# Patient Record
Sex: FEMALE | Race: WHITE | URBAN - METROPOLITAN AREA
[De-identification: names, ages, dates, MRNs, and addresses within clinical notes are randomized per-mention and may not be internally consistent; named-entity substitution may affect disease eponyms.]

---

## 2017-12-15 ENCOUNTER — OFFICE VISIT (OUTPATIENT)
Dept: ENDOCRINOLOGY | Age: 22
End: 2017-12-15

## 2017-12-15 VITALS
BODY MASS INDEX: 17.96 KG/M2 | OXYGEN SATURATION: 96 % | DIASTOLIC BLOOD PRESSURE: 78 MMHG | HEART RATE: 81 BPM | HEIGHT: 65 IN | WEIGHT: 107.8 LBS | SYSTOLIC BLOOD PRESSURE: 120 MMHG

## 2017-12-15 DIAGNOSIS — E03.8 HYPOTHYROIDISM DUE TO HASHIMOTO'S THYROIDITIS: Primary | ICD-10-CM

## 2017-12-15 DIAGNOSIS — E03.8 HYPOTHYROIDISM DUE TO HASHIMOTO'S THYROIDITIS: ICD-10-CM

## 2017-12-15 DIAGNOSIS — E06.3 HYPOTHYROIDISM DUE TO HASHIMOTO'S THYROIDITIS: Primary | ICD-10-CM

## 2017-12-15 DIAGNOSIS — E06.3 HYPOTHYROIDISM DUE TO HASHIMOTO'S THYROIDITIS: ICD-10-CM

## 2017-12-15 DIAGNOSIS — E06.3 HASHIMOTO'S THYROIDITIS: ICD-10-CM

## 2017-12-15 DIAGNOSIS — R79.0 LOW FERRITIN: ICD-10-CM

## 2017-12-15 LAB
A/G RATIO: 1.8 (ref 1.1–2.2)
ALBUMIN SERPL-MCNC: 4.7 G/DL (ref 3.4–5)
ALP BLD-CCNC: 57 U/L (ref 40–129)
ALT SERPL-CCNC: 9 U/L (ref 10–40)
ANION GAP SERPL CALCULATED.3IONS-SCNC: 14 MMOL/L (ref 3–16)
AST SERPL-CCNC: 17 U/L (ref 15–37)
BILIRUB SERPL-MCNC: 0.4 MG/DL (ref 0–1)
BUN BLDV-MCNC: 11 MG/DL (ref 7–20)
CALCIUM SERPL-MCNC: 10.3 MG/DL (ref 8.3–10.6)
CHLORIDE BLD-SCNC: 101 MMOL/L (ref 99–110)
CO2: 25 MMOL/L (ref 21–32)
CREAT SERPL-MCNC: 0.7 MG/DL (ref 0.6–1.1)
FERRITIN: 22.7 NG/ML (ref 15–150)
GFR AFRICAN AMERICAN: >60
GFR NON-AFRICAN AMERICAN: >60
GLOBULIN: 2.6 G/DL
GLUCOSE BLD-MCNC: 70 MG/DL (ref 70–99)
HCT VFR BLD CALC: 40.2 % (ref 36–48)
HEMOGLOBIN: 13.5 G/DL (ref 12–16)
IRON SATURATION: 21 % (ref 15–50)
IRON: 91 UG/DL (ref 37–145)
MCH RBC QN AUTO: 29.6 PG (ref 26–34)
MCHC RBC AUTO-ENTMCNC: 33.7 G/DL (ref 31–36)
MCV RBC AUTO: 88 FL (ref 80–100)
PDW BLD-RTO: 13 % (ref 12.4–15.4)
PLATELET # BLD: 322 K/UL (ref 135–450)
PMV BLD AUTO: 7.7 FL (ref 5–10.5)
POTASSIUM SERPL-SCNC: 4.9 MMOL/L (ref 3.5–5.1)
RBC # BLD: 4.57 M/UL (ref 4–5.2)
SODIUM BLD-SCNC: 140 MMOL/L (ref 136–145)
T3 TOTAL: 1.33 NG/ML (ref 0.64–1.95)
T4 FREE: 1.7 NG/DL (ref 0.9–1.8)
TOTAL IRON BINDING CAPACITY: 426 UG/DL (ref 260–445)
TOTAL PROTEIN: 7.3 G/DL (ref 6.4–8.2)
TSH SERPL DL<=0.05 MIU/L-ACNC: 1.46 UIU/ML (ref 0.27–4.2)
WBC # BLD: 6.6 K/UL (ref 4–11)

## 2017-12-15 PROCEDURE — 99203 OFFICE O/P NEW LOW 30 MIN: CPT | Performed by: INTERNAL MEDICINE

## 2017-12-15 RX ORDER — LEVOTHYROXINE SODIUM 112 UG/1
TABLET ORAL
COMMUNITY
Start: 2017-10-23 | End: 2017-12-15 | Stop reason: SDUPTHER

## 2017-12-15 RX ORDER — LEVOTHYROXINE SODIUM 112 UG/1
112 TABLET ORAL DAILY
Qty: 90 TABLET | Refills: 1 | Status: SHIPPED | OUTPATIENT
Start: 2017-12-15 | End: 2018-01-24 | Stop reason: SDUPTHER

## 2017-12-15 NOTE — PROGRESS NOTES
SUBJECTIVE:  Clementina Negron is a 23 y.o. female who is here for hypothyroidism. 1. Hypothyroidism due to Hashimoto's thyroiditis    This started in 2015. Patient was diagnosed with hypothyroidism. The problem has been unchanged. Previous thyroid studies include: TSH and free thyroxine. Patient started medication in 2015. Currently patient is on: levothyroxine. Misses  0 doses a month. Current complaints: fatigue, feeling cold and cold intolerance, dry skin. History of obstructive symptoms: difficulty swallowing No, changes in voice/hoarseness No.    History of radiation to patient's neck: No  Resent iodine exposure: No  Family history includes goiter. Family history of thyroid cancer: No    [place] did not work, TSH was high. 2. Hashimoto's thyroiditis  Has fatigue. 3. Low ferritin  Has fatigue. Worse in morning, afternoon. Moderate. Past Medical History:   Diagnosis Date    GERD (gastroesophageal reflux disease)     Hypothyroidism     Irritable bowel syndrome      There are no active problems to display for this patient. Past Surgical History:   Procedure Laterality Date    COLONOSCOPY      UPPER GASTROINTESTINAL ENDOSCOPY       Family History   Problem Relation Age of Onset    No Known Problems Mother     High Cholesterol Father     Thyroid Disease Father      Social History     Social History    Marital status: Single     Spouse name: N/A    Number of children: N/A    Years of education: N/A     Social History Main Topics    Smoking status: Never Smoker    Smokeless tobacco: Never Used    Alcohol use No    Drug use: No    Sexual activity: No     Other Topics Concern    None     Social History Narrative    None     Current Outpatient Prescriptions   Medication Sig Dispense Refill    levothyroxine (SYNTHROID) 112 MCG tablet       Desogestrel-Ethinyl Estradiol (VIORELE PO) Take by mouth       No current facility-administered medications for this visit.       No Known Allergies  Family Status   Relation Status    Mother [de-identified]    Father Alive       Review of Systems:  Constitutional: has fatigue, no fever, no recent weight gain, no recent weight loss, no changes in appetite  Eyes: no eye pain, no change in vision, no eye redness, no eye irritation, no double vision, sometimes blurry vision  Ears, nose, throat: no nasal congestion, no sore throat, no earache, no decrease in hearing, no hoarseness, no dry mouth, has sinus problems, no difficulty swallowing, no neck lumps, no dental problems, no mouth sores, no ringing in ears  Pulmonary: no shortness of breath, no wheezing, no dyspnea on exertion, no cough  Cardiovascular: no chest pain, no lower extremity edema, no orthopnea, no palpitations  Gastrointestinal: no abdominal pain, no nausea, no vomiting, no diarrhea, no constipation, no heartburn, no bloating  Genitourinary: no dysuria, no urinary incontinence, no urinary hesitancy, no change in urinary frequency, no feelings of urinary urgency, no nocturia  Musculoskeletal: no joint swelling, no joint stiffness, no joint pain, no muscle cramps, no muscle pain, no bone pain, had finger fracture  Integument/Breast: no hair loss, no skin rashes, no skin lesions, no itching, has dry skin, no breast pain, no breast mass, no skin hives, no skin discoloration, no nipple discharge  Neurological: no numbness, no tingling, no weakness, no confusion, has headaches, no dizziness, no fainting, no tremors, no decrease in memory, no balance problems  Psychiatric: no anxiety, no depression, no insomnia  Hematologic/Lymphatic: no tendency for easy bleeding, no swollen lymph nodes, no tendency for easy bruising  Immunology: no seasonal allergies, no frequent infections, no frequent illnesses  Endocrine: has cold temperature intolerance, no hot flashes, no hand tremor    OBJECTIVE:   /78 (Site: Left Arm, Position: Sitting, Cuff Size: Medium Adult)   Pulse 81   Ht 5' 5\" (1.651 m)   Wt 107 lb FT3.    3. Low ferritin    - Iron and TIBC; Future  - CBC; Future  - FERRITIN; Future  - FERRITIN; Future  - Iron and TIBC; Future  - CBC; Future      Reviewed and/or ordered clinical lab results Yes  Reviewed and/or ordered radiology tests Yes   Reviewed and/or ordered other diagnostic tests No  Discussed test results with performing physician No  Independently reviewed image, tracing, or specimen No  Made a decision to obtain old records Yes  Reviewed old records Yes   Obtained history from other than patient Yes    Medhatlaila Mayes was counseled regarding symptoms of current diagnosis, course and complications of disease if inadequately treated, side effects of medications, diagnosis, treatment options, and prognosis, risks, benefits, complications, and alternatives of treatment, labs, imaging and other studies and treatment targets and goals. She understands instructions and counseling. Return in about 6 months (around 6/15/2018) for thyroid problems.

## 2018-01-24 DIAGNOSIS — E03.8 HYPOTHYROIDISM DUE TO HASHIMOTO'S THYROIDITIS: ICD-10-CM

## 2018-01-24 DIAGNOSIS — E06.3 HYPOTHYROIDISM DUE TO HASHIMOTO'S THYROIDITIS: ICD-10-CM

## 2018-01-24 RX ORDER — LEVOTHYROXINE SODIUM 112 UG/1
112 TABLET ORAL DAILY
Qty: 90 TABLET | Refills: 1 | Status: SHIPPED | OUTPATIENT
Start: 2018-01-24 | End: 2018-01-25 | Stop reason: SDUPTHER

## 2018-01-25 RX ORDER — LEVOTHYROXINE SODIUM 112 UG/1
112 TABLET ORAL DAILY
Qty: 90 TABLET | Refills: 1 | Status: SHIPPED | OUTPATIENT
Start: 2018-01-25 | End: 2018-04-23 | Stop reason: SDUPTHER

## 2018-04-23 DIAGNOSIS — E06.3 HYPOTHYROIDISM DUE TO HASHIMOTO'S THYROIDITIS: ICD-10-CM

## 2018-04-23 DIAGNOSIS — E03.8 HYPOTHYROIDISM DUE TO HASHIMOTO'S THYROIDITIS: ICD-10-CM

## 2018-04-23 RX ORDER — LEVOTHYROXINE SODIUM 112 UG/1
112 TABLET ORAL DAILY
Qty: 90 TABLET | Refills: 1 | Status: SHIPPED | OUTPATIENT
Start: 2018-04-23 | End: 2018-07-19 | Stop reason: SDUPTHER

## 2018-06-15 ENCOUNTER — OFFICE VISIT (OUTPATIENT)
Dept: ENDOCRINOLOGY | Age: 23
End: 2018-06-15

## 2018-06-15 VITALS
WEIGHT: 104.4 LBS | DIASTOLIC BLOOD PRESSURE: 68 MMHG | SYSTOLIC BLOOD PRESSURE: 104 MMHG | OXYGEN SATURATION: 99 % | BODY MASS INDEX: 17.4 KG/M2 | HEART RATE: 92 BPM | HEIGHT: 65 IN

## 2018-06-15 DIAGNOSIS — R79.0 LOW FERRITIN: ICD-10-CM

## 2018-06-15 DIAGNOSIS — E03.9 ACQUIRED HYPOTHYROIDISM: Primary | ICD-10-CM

## 2018-06-15 DIAGNOSIS — E06.3 HASHIMOTO'S THYROIDITIS: ICD-10-CM

## 2018-06-15 PROCEDURE — 99214 OFFICE O/P EST MOD 30 MIN: CPT | Performed by: INTERNAL MEDICINE

## 2018-06-15 PROCEDURE — G8419 CALC BMI OUT NRM PARAM NOF/U: HCPCS | Performed by: INTERNAL MEDICINE

## 2018-06-15 PROCEDURE — G8427 DOCREV CUR MEDS BY ELIG CLIN: HCPCS | Performed by: INTERNAL MEDICINE

## 2018-06-15 PROCEDURE — 1036F TOBACCO NON-USER: CPT | Performed by: INTERNAL MEDICINE

## 2018-06-15 RX ORDER — LEVONORGESTREL AND ETHINYL ESTRADIOL 100-20(84)
KIT ORAL
Refills: 1 | COMMUNITY
Start: 2018-05-29

## 2018-06-15 ASSESSMENT — PATIENT HEALTH QUESTIONNAIRE - PHQ9
2. FEELING DOWN, DEPRESSED OR HOPELESS: 0
1. LITTLE INTEREST OR PLEASURE IN DOING THINGS: 0
SUM OF ALL RESPONSES TO PHQ QUESTIONS 1-9: 0
SUM OF ALL RESPONSES TO PHQ9 QUESTIONS 1 & 2: 0

## 2018-07-19 DIAGNOSIS — E03.8 HYPOTHYROIDISM DUE TO HASHIMOTO'S THYROIDITIS: ICD-10-CM

## 2018-07-19 DIAGNOSIS — E06.3 HYPOTHYROIDISM DUE TO HASHIMOTO'S THYROIDITIS: ICD-10-CM

## 2018-07-19 RX ORDER — LEVOTHYROXINE SODIUM 112 UG/1
112 TABLET ORAL DAILY
Qty: 90 TABLET | Refills: 1 | Status: SHIPPED | OUTPATIENT
Start: 2018-07-19 | End: 2018-09-17 | Stop reason: SDUPTHER

## 2018-09-17 ENCOUNTER — OFFICE VISIT (OUTPATIENT)
Dept: ENDOCRINOLOGY | Age: 23
End: 2018-09-17

## 2018-09-17 VITALS
DIASTOLIC BLOOD PRESSURE: 82 MMHG | WEIGHT: 111 LBS | SYSTOLIC BLOOD PRESSURE: 124 MMHG | BODY MASS INDEX: 18.49 KG/M2 | HEIGHT: 65 IN | HEART RATE: 98 BPM | OXYGEN SATURATION: 99 %

## 2018-09-17 DIAGNOSIS — R79.0 LOW FERRITIN: ICD-10-CM

## 2018-09-17 DIAGNOSIS — E03.9 ACQUIRED HYPOTHYROIDISM: Primary | ICD-10-CM

## 2018-09-17 DIAGNOSIS — E06.3 HASHIMOTO'S THYROIDITIS: ICD-10-CM

## 2018-09-17 PROCEDURE — 1036F TOBACCO NON-USER: CPT | Performed by: INTERNAL MEDICINE

## 2018-09-17 PROCEDURE — G8427 DOCREV CUR MEDS BY ELIG CLIN: HCPCS | Performed by: INTERNAL MEDICINE

## 2018-09-17 PROCEDURE — 99214 OFFICE O/P EST MOD 30 MIN: CPT | Performed by: INTERNAL MEDICINE

## 2018-09-17 PROCEDURE — G8419 CALC BMI OUT NRM PARAM NOF/U: HCPCS | Performed by: INTERNAL MEDICINE

## 2018-09-17 RX ORDER — LEVOTHYROXINE SODIUM 112 UG/1
TABLET ORAL
Qty: 120 TABLET | Refills: 1
Start: 2018-09-17 | End: 2018-10-18 | Stop reason: SDUPTHER

## 2018-09-17 ASSESSMENT — PATIENT HEALTH QUESTIONNAIRE - PHQ9
SUM OF ALL RESPONSES TO PHQ QUESTIONS 1-9: 0
1. LITTLE INTEREST OR PLEASURE IN DOING THINGS: 0
SUM OF ALL RESPONSES TO PHQ9 QUESTIONS 1 & 2: 0
2. FEELING DOWN, DEPRESSED OR HOPELESS: 0
SUM OF ALL RESPONSES TO PHQ QUESTIONS 1-9: 0

## 2018-09-17 NOTE — PROGRESS NOTES
SUBJECTIVE:  Ivon Posey is a 26 y.o. female who is here for hypothyroidism. 1. Hypothyroidism     This started in 2015. Patient was diagnosed with hypothyroidism. The problem has been unchanged. Patient started medication in 2015. Currently patient is on: levothyroxine. Misses  0 doses a month. Current complaints: fatigue, feeling cold and cold intolerance, dry skin. Same. History of obstructive symptoms: difficulty swallowing No, changes in voice/hoarseness No.  History of radiation to patient's neck: No  Resent iodine exposure: No  Family history includes goiter. Family history of thyroid cancer: No    [place] did not work, TSH was high. 2. Hashimoto's thyroiditis  Has fatigue. 3. Low ferritin  Has fatigue. Worse in morning, afternoon. Moderate. Can not take supplement.     Past Medical History:   Diagnosis Date    GERD (gastroesophageal reflux disease)     Hypothyroidism     Irritable bowel syndrome      Patient Active Problem List    Diagnosis Date Noted    Acquired hypothyroidism 12/15/2017    Hashimoto's thyroiditis 12/15/2017    Low ferritin 12/15/2017     Past Surgical History:   Procedure Laterality Date    COLONOSCOPY      UPPER GASTROINTESTINAL ENDOSCOPY       Family History   Problem Relation Age of Onset    No Known Problems Mother     High Cholesterol Father     Thyroid Disease Father      Social History     Social History    Marital status: Single     Spouse name: N/A    Number of children: N/A    Years of education: N/A     Social History Main Topics    Smoking status: Never Smoker    Smokeless tobacco: Never Used    Alcohol use No    Drug use: No    Sexual activity: No     Other Topics Concern    None     Social History Narrative    None     Current Outpatient Prescriptions   Medication Sig Dispense Refill    levothyroxine (SYNTHROID) 112 MCG tablet 6 days a week, one and a half tablet 1 day a week 120 tablet 1    Levonorgest-Eth Estrad 91-Day 0.1-0.02 & 0.01 MG TABS TAKE 1 TABLET BY MOUTH DAILY FOR 91 DAYS  1     No current facility-administered medications for this visit.       No Known Allergies  Family Status   Relation Status    Mother [de-identified]    Father Alive       Review of Systems:  Constitutional: has fatigue, no fever, no recent weight gain, no recent weight loss, no changes in appetite  Eyes: no eye pain, no change in vision, no eye redness, no eye irritation, no double vision, sometimes blurry vision  Ears, nose, throat: no nasal congestion, no sore throat, no earache, no decrease in hearing, no hoarseness, no dry mouth, has sinus problems, no difficulty swallowing, no neck lumps, no dental problems, no mouth sores, no ringing in ears  Pulmonary: no shortness of breath, no wheezing, no dyspnea on exertion, no cough  Cardiovascular: no chest pain, no lower extremity edema, no orthopnea, no palpitations  Gastrointestinal: no abdominal pain, no nausea, no vomiting, no diarrhea, no constipation, no heartburn, no bloating  Genitourinary: no dysuria, no urinary incontinence, no urinary hesitancy, no change in urinary frequency, no feelings of urinary urgency, no nocturia  Musculoskeletal: no joint swelling, no joint stiffness, no joint pain, no muscle cramps, no muscle pain, no bone pain, had finger fracture  Integument/Breast: no hair loss, no skin rashes, no skin lesions, no itching, has dry skin, no breast pain, no breast mass, no skin hives, no skin discoloration, no nipple discharge  Neurological: no numbness, no tingling, no weakness, no confusion, has headaches, no dizziness, no fainting, no tremors, no decrease in memory, no balance problems  Psychiatric: no anxiety, no depression, no insomnia  Hematologic/Lymphatic: no tendency for easy bleeding, no swollen lymph nodes, no tendency for easy bruising  Immunology: no seasonal allergies, no frequent infections, no frequent illnesses  Endocrine: has cold temperature intolerance, no hot flashes, no hand Reflex; Future  - T4, Free; Future  - T3; Future  Uncontrolled. 2. Hashimoto's thyroiditis  TSH, FT4, FT3.    3. Low ferritin  Still low normal ferritin. Can't take iron supplements. Follow for now. - Iron and TIBC; Future  - CBC; Future  - FERRITIN; Future    Reviewed and/or ordered clinical lab results Yes  Reviewed and/or ordered radiology tests Yes   Reviewed and/or ordered other diagnostic tests No  Discussed test results with performing physician No  Independently reviewed image, tracing, or specimen No  Made a decision to obtain old records Yes  Reviewed old records Yes   Obtained history from other than patient Yes    Garima Woodruff was counseled regarding symptoms of thyroid diagnosis, course and complications of disease if inadequately treated, side effects of medications, diagnosis, treatment options, and prognosis, risks, benefits, complications, and alternatives of treatment, labs, imaging and other studies and treatment targets and goals. She understands instructions and counseling. Return in about 3 months (around 12/17/2018) for thyroid problems.

## 2018-10-18 DIAGNOSIS — E03.9 ACQUIRED HYPOTHYROIDISM: ICD-10-CM

## 2018-10-18 RX ORDER — LEVOTHYROXINE SODIUM 112 UG/1
TABLET ORAL
Qty: 120 TABLET | Refills: 0 | Status: SHIPPED | OUTPATIENT
Start: 2018-10-18 | End: 2019-02-08 | Stop reason: SDUPTHER

## 2018-10-18 NOTE — TELEPHONE ENCOUNTER
Patient called for a refill of Levothyroxine, sent to a DIFFERENT [facility] for [facility].  The address is [address] [phone]

## 2018-12-04 ENCOUNTER — TELEPHONE (OUTPATIENT)
Dept: ENDOCRINOLOGY | Age: 23
End: 2018-12-04

## 2018-12-11 ENCOUNTER — OFFICE VISIT (OUTPATIENT)
Dept: ENDOCRINOLOGY | Age: 23
End: 2018-12-11
Payer: COMMERCIAL

## 2018-12-11 VITALS
WEIGHT: 105.8 LBS | HEIGHT: 65 IN | OXYGEN SATURATION: 99 % | BODY MASS INDEX: 17.63 KG/M2 | DIASTOLIC BLOOD PRESSURE: 74 MMHG | SYSTOLIC BLOOD PRESSURE: 118 MMHG | HEART RATE: 91 BPM

## 2018-12-11 DIAGNOSIS — E03.9 ACQUIRED HYPOTHYROIDISM: Primary | ICD-10-CM

## 2018-12-11 DIAGNOSIS — R79.0 LOW FERRITIN: ICD-10-CM

## 2018-12-11 DIAGNOSIS — E06.3 HASHIMOTO'S THYROIDITIS: ICD-10-CM

## 2018-12-11 PROCEDURE — G8484 FLU IMMUNIZE NO ADMIN: HCPCS | Performed by: INTERNAL MEDICINE

## 2018-12-11 PROCEDURE — G8419 CALC BMI OUT NRM PARAM NOF/U: HCPCS | Performed by: INTERNAL MEDICINE

## 2018-12-11 PROCEDURE — 99214 OFFICE O/P EST MOD 30 MIN: CPT | Performed by: INTERNAL MEDICINE

## 2018-12-11 PROCEDURE — G8427 DOCREV CUR MEDS BY ELIG CLIN: HCPCS | Performed by: INTERNAL MEDICINE

## 2018-12-11 PROCEDURE — 1036F TOBACCO NON-USER: CPT | Performed by: INTERNAL MEDICINE

## 2018-12-11 NOTE — PROGRESS NOTES
Levonorgest-Eth Estrad 91-Day 0.1-0.02 & 0.01 MG TABS TAKE 1 TABLET BY MOUTH DAILY FOR 91 DAYS  1     No current facility-administered medications for this visit.       No Known Allergies  Family Status   Relation Status    Mother [de-identified]    Father Alive       Review of Systems:  Constitutional: has fatigue, no fever, no recent weight gain, no recent weight loss, no changes in appetite  Eyes: no eye pain, no change in vision, no eye redness, no eye irritation, no double vision, sometimes blurry vision  Ears, nose, throat: no nasal congestion, no sore throat, no earache, no decrease in hearing, no hoarseness, no dry mouth, has sinus problems, no difficulty swallowing, no neck lumps, no dental problems, no mouth sores, no ringing in ears  Pulmonary: no shortness of breath, no wheezing, no dyspnea on exertion, no cough  Cardiovascular: no chest pain, no lower extremity edema, no orthopnea, no palpitations  Gastrointestinal: no abdominal pain, no nausea, no vomiting, no diarrhea, no constipation, no heartburn, no bloating  Genitourinary: no dysuria, no urinary incontinence, no urinary hesitancy, no change in urinary frequency, no feelings of urinary urgency, no nocturia  Musculoskeletal: no joint swelling, no joint stiffness, no joint pain, no muscle cramps, no muscle pain, no bone pain, had finger fracture  Integument/Breast: no hair loss, no skin rashes, no skin lesions, no itching, has dry skin, no breast pain, no breast mass, no skin hives, no skin discoloration, no nipple discharge  Neurological: no numbness, no tingling, no weakness, no confusion, has headaches, no dizziness, no fainting, no tremors, no decrease in memory, no balance problems  Psychiatric: no anxiety, no depression, no insomnia  Hematologic/Lymphatic: no tendency for easy bleeding, no swollen lymph nodes, no tendency for easy bruising  Immunology: no seasonal allergies, no frequent infections, no frequent illnesses  Endocrine: has cold temperature

## 2018-12-19 ENCOUNTER — TELEPHONE (OUTPATIENT)
Dept: ENDOCRINOLOGY | Age: 23
End: 2018-12-19

## 2019-02-08 DIAGNOSIS — E03.9 ACQUIRED HYPOTHYROIDISM: ICD-10-CM

## 2019-02-08 RX ORDER — LEVOTHYROXINE SODIUM 112 UG/1
TABLET ORAL
Qty: 120 TABLET | Refills: 1 | Status: SHIPPED | OUTPATIENT
Start: 2019-02-08 | End: 2019-03-29

## 2019-03-29 ENCOUNTER — OFFICE VISIT (OUTPATIENT)
Dept: ENDOCRINOLOGY | Age: 24
End: 2019-03-29
Payer: COMMERCIAL

## 2019-03-29 VITALS
SYSTOLIC BLOOD PRESSURE: 116 MMHG | HEART RATE: 87 BPM | WEIGHT: 108.4 LBS | DIASTOLIC BLOOD PRESSURE: 67 MMHG | HEIGHT: 65 IN | BODY MASS INDEX: 18.06 KG/M2

## 2019-03-29 DIAGNOSIS — E03.9 ACQUIRED HYPOTHYROIDISM: Primary | ICD-10-CM

## 2019-03-29 DIAGNOSIS — R79.0 LOW FERRITIN: ICD-10-CM

## 2019-03-29 DIAGNOSIS — E06.3 HASHIMOTO'S THYROIDITIS: ICD-10-CM

## 2019-03-29 PROCEDURE — 99214 OFFICE O/P EST MOD 30 MIN: CPT | Performed by: INTERNAL MEDICINE

## 2019-03-29 RX ORDER — LEVOTHYROXINE SODIUM 112 UG/1
112 TABLET ORAL DAILY
Qty: 90 TABLET | Refills: 1 | Status: SHIPPED | OUTPATIENT
Start: 2019-03-29 | End: 2019-11-27

## 2019-06-28 ENCOUNTER — OFFICE VISIT (OUTPATIENT)
Dept: ENDOCRINOLOGY | Age: 24
End: 2019-06-28
Payer: COMMERCIAL

## 2019-06-28 VITALS
HEART RATE: 120 BPM | BODY MASS INDEX: 18.29 KG/M2 | DIASTOLIC BLOOD PRESSURE: 80 MMHG | SYSTOLIC BLOOD PRESSURE: 131 MMHG | WEIGHT: 109.8 LBS | HEIGHT: 65 IN | OXYGEN SATURATION: 98 %

## 2019-06-28 DIAGNOSIS — E83.52 HYPERCALCEMIA: ICD-10-CM

## 2019-06-28 DIAGNOSIS — E06.3 HASHIMOTO'S THYROIDITIS: ICD-10-CM

## 2019-06-28 DIAGNOSIS — E03.9 ACQUIRED HYPOTHYROIDISM: Primary | ICD-10-CM

## 2019-06-28 DIAGNOSIS — R79.0 LOW FERRITIN: ICD-10-CM

## 2019-06-28 PROCEDURE — 99214 OFFICE O/P EST MOD 30 MIN: CPT | Performed by: INTERNAL MEDICINE

## 2019-06-28 NOTE — PROGRESS NOTES
Non-medical: None   Tobacco Use    Smoking status: Never Smoker    Smokeless tobacco: Never Used   Substance and Sexual Activity    Alcohol use: No    Drug use: No    Sexual activity: Never   Lifestyle    Physical activity:     Days per week: None     Minutes per session: None    Stress: None   Relationships    Social connections:     Talks on phone: None     Gets together: None     Attends Oriental orthodox service: None     Active member of club or organization: None     Attends meetings of clubs or organizations: None     Relationship status: None    Intimate partner violence:     Fear of current or ex partner: None     Emotionally abused: None     Physically abused: None     Forced sexual activity: None   Other Topics Concern    None   Social History Narrative    None     Current Outpatient Medications   Medication Sig Dispense Refill    levothyroxine (SYNTHROID) 112 MCG tablet Take 1 tablet by mouth Daily 90 tablet 1    Levonorgest-Eth Estrad 91-Day 0.1-0.02 & 0.01 MG TABS TAKE 1 TABLET BY MOUTH DAILY FOR 91 DAYS  1     No current facility-administered medications for this visit.       No Known Allergies  Family Status   Relation Name Status    Mother  Alive    Father  Alive       Review of Systems:  Constitutional: has fatigue, no fever, no recent weight gain, no recent weight loss, no changes in appetite  Eyes: no eye pain, no change in vision, no eye redness, no eye irritation, no double vision, sometimes blurry vision  Ears, nose, throat: no nasal congestion, no sore throat, no earache, no decrease in hearing, no hoarseness, no dry mouth, has sinus problems, no difficulty swallowing, no neck lumps, no dental problems, no mouth sores, no ringing in ears  Pulmonary: no shortness of breath, no wheezing, no dyspnea on exertion, no cough  Cardiovascular: no chest pain, no lower extremity edema, no orthopnea, no palpitations  Gastrointestinal: no abdominal pain, no nausea, no vomiting, no diarrhea, no constipation, no heartburn, no bloating  Genitourinary: no dysuria, no urinary incontinence, no urinary hesitancy, no change in urinary frequency, no feelings of urinary urgency, no nocturia  Musculoskeletal: no joint swelling, no joint stiffness, no joint pain, no muscle cramps, no muscle pain, no bone pain, had finger fracture  Integument/Breast: no hair loss, no skin rashes, no skin lesions  Neurological: no numbness, no tingling, no weakness, no confusion, has headaches, no dizziness, no fainting, no tremors, no decrease in memory, no balance problems  Psychiatric: no anxiety, no depression, no insomnia  Hematologic/Lymphatic: no tendency for easy bleeding, no swollen lymph nodes, no tendency for easy bruising  Immunology: no seasonal allergies, no frequent infections, no frequent illnesses  Endocrine: has cold temperature intolerance, no hot flashes, no hand tremor    OBJECTIVE:   /80 (Site: Left Upper Arm, Position: Sitting, Cuff Size: Medium Adult)   Pulse 120   Ht 5' 5\" (1.651 m)   Wt 109 lb 12.8 oz (49.8 kg)   SpO2 98%   BMI 18.27 kg/m²   Wt Readings from Last 3 Encounters:   06/28/19 109 lb 12.8 oz (49.8 kg)   03/29/19 108 lb 6.4 oz (49.2 kg)   12/11/18 105 lb 12.8 oz (48 kg)       Physical Exam:  Constitutional: no acute distress, well appearing, well nourished  Psychiatric: oriented to person, place and time, judgement, insight and normal, recent and remote memory and intact and mood, affect are normal  Skin: skin and subcutaneous tissue is normal without mass, normal turgor  Head and Face: examination of head and face revealed no abnormalities  Eyes: no lid or conjunctival swelling, no erythema or discharge, pupils are normal, equal, round, and reactive to light  Ears/Nose: external inspection of ears and nose revealed no abnormalities, hearing is grossly normal  Oropharynx/Mouth/Face: lips, tongue and gums are normal with no lesions, the voice quality was normal  Neck: neck is supple and symmetric, with midline trachea and no masses, thyroid is normal  Lymphatics: normal cervical lymph nodes, normal supraclavicular nodes  Pulmonary: no increased work of breathing or signs of respiratory distress, lungs are clear to auscultation  Cardiovascular: normal heart rate and rhythm, normal S1 and S2, no murmurs and pedal pulses and 2+ bilaterally, No edema  Abdomen: abdomen is soft, non-tender with no masses  Musculoskeletal: normal gait and station, exam of the digits and nails are normal  Neurological: normal coordination, normal general cortical function    Lab Review:  Lab Results   Component Value Date    TSH 1.46 12/15/2017     No results found for: FREET4     ASSESSMENT/PLAN:  1. Hypothyroidism   TSH 2.4-1.9  Levothyroxine 0.112 mg qd  - Comprehensive Metabolic Panel; Future  - TSH without Reflex; Future  - T4, Free; Future  - T3; Future    2. Hashimoto's thyroiditis  TSH, FT4, FT3.    3. Low ferritin  Still low normal ferritin. Can't take iron supplements. Follow for now. - Iron and TIBC; Future  - CBC; Future  - FERRITIN; Future    4. Hypercalcemia  New problem  2 glasses a day  1 Angie a day sometimes. Calcium 10. 6(10.5)  No MVI    Reviewed and/or ordered clinical lab results Yes  Reviewed and/or ordered radiology tests Yes   Reviewed and/or ordered other diagnostic tests No  Discussed test results with performing physician No  Independently reviewed image, tracing, or specimen No  Made a decision to obtain old records Yes  Reviewed old records Yes   Obtained history from other than patient Yes    Fish Damon was counseled regarding symptoms of thyroid diagnosis, course and complications of disease if inadequately treated, side effects of medications, diagnosis, treatment options, and prognosis, risks, benefits, complications, and alternatives of treatment, labs, imaging and other studies and treatment targets and goals, hypercalcemia, work up, causes.   She understands instructions and counseling. Total visit time 25 min, >50% was counseling time    Return in about 4 years (around 6/28/2023) for thyroid problems.

## 2019-11-26 PROBLEM — E83.52 HYPERCALCEMIA: Status: ACTIVE | Noted: 2019-11-26

## 2019-11-27 ENCOUNTER — OFFICE VISIT (OUTPATIENT)
Dept: ENDOCRINOLOGY | Age: 24
End: 2019-11-27
Payer: COMMERCIAL

## 2019-11-27 VITALS
HEIGHT: 65 IN | BODY MASS INDEX: 17.99 KG/M2 | OXYGEN SATURATION: 98 % | DIASTOLIC BLOOD PRESSURE: 76 MMHG | SYSTOLIC BLOOD PRESSURE: 120 MMHG | WEIGHT: 108 LBS | HEART RATE: 95 BPM

## 2019-11-27 DIAGNOSIS — E06.3 HASHIMOTO'S THYROIDITIS: ICD-10-CM

## 2019-11-27 DIAGNOSIS — R79.0 LOW FERRITIN: ICD-10-CM

## 2019-11-27 DIAGNOSIS — E03.9 ACQUIRED HYPOTHYROIDISM: Primary | ICD-10-CM

## 2019-11-27 DIAGNOSIS — E83.52 HYPERCALCEMIA: ICD-10-CM

## 2019-11-27 PROCEDURE — 99214 OFFICE O/P EST MOD 30 MIN: CPT | Performed by: INTERNAL MEDICINE

## 2019-11-27 RX ORDER — LEVOTHYROXINE SODIUM 112 UG/1
TABLET ORAL
Qty: 100 TABLET | Refills: 1
Start: 2019-11-27 | End: 2020-01-16

## 2020-01-16 RX ORDER — LEVOTHYROXINE SODIUM 112 UG/1
TABLET ORAL
Qty: 100 TABLET | Refills: 1 | Status: SHIPPED | OUTPATIENT
Start: 2020-01-16 | End: 2020-06-29 | Stop reason: SDUPTHER

## 2020-03-29 NOTE — PROGRESS NOTES
Fear of current or ex partner: None     Emotionally abused: None     Physically abused: None     Forced sexual activity: None   Other Topics Concern    None   Social History Narrative    None     Current Outpatient Medications   Medication Sig Dispense Refill    amphetamine-dextroamphetamine (ADDERALL) 10 MG tablet TAKE 1 TABLET BY MOUTH EVERY MORNING AND 1 TABLET AFTER LUNCH AS DIRECTED      levothyroxine (SYNTHROID) 112 MCG tablet 1 tablet 6 days a week, one and a half tablet 1 day a week 100 tablet 1    Levonorgest-Eth Estrad 91-Day 0.1-0.02 & 0.01 MG TABS TAKE 1 TABLET BY MOUTH DAILY FOR 91 DAYS  1     No current facility-administered medications for this visit.       No Known Allergies  Family Status   Relation Name Status    Mother  Alive    Father  Alive       Review of Systems:  Constitutional: has fatigue, no fever, no recent weight gain, no recent weight loss, no changes in appetite  Eyes: no eye pain, no change in vision, no eye redness, no eye irritation, no double vision, sometimes blurry vision  Ears, nose, throat: no nasal congestion, no sore throat, no earache, no decrease in hearing, no hoarseness, no dry mouth, has sinus problems, no difficulty swallowing, no neck lumps, no dental problems, no mouth sores, no ringing in ears  Pulmonary: no shortness of breath, no wheezing, no dyspnea on exertion, no cough  Cardiovascular: no chest pain, no lower extremity edema, no orthopnea, no palpitations  Gastrointestinal: no abdominal pain, no nausea, no vomiting, no diarrhea, no constipation, no heartburn, no bloating  Genitourinary: no dysuria, no urinary incontinence, no urinary hesitancy, no change in urinary frequency, no feelings of urinary urgency, no nocturia  Musculoskeletal: no joint swelling, no joint stiffness, no joint pain, no muscle cramps, no muscle pain, no bone pain, had finger fracture  Integument/Breast: no hair loss, no skin rashes, no skin lesions  Neurological: no numbness, no tingling, no weakness, no confusion, has headaches, no dizziness, no fainting, no tremors, no decrease in memory, no balance problems  Psychiatric: no anxiety, no depression, no insomnia  Hematologic/Lymphatic: no tendency for easy bleeding, no swollen lymph nodes, no tendency for easy bruising  Immunology: no seasonal allergies, no frequent infections, no frequent illnesses  Endocrine: has cold temperature intolerance, no hot flashes, no hand tremor    OBJECTIVE:  Constitutional: no acute distress, well appearing and well nourished  Psychiatric: oriented to person, place and time, judgement and insight and normal, recent and remote memory and intact and mood and affect are normal  Skin: skin inspection appears normal  Head and Face: head and face inspection revealed no abnormalities  Eyes: no lid or conjunctival swelling, erythema or discharge  Ears/Nose: external inspection of ears and nose revealed no abnormalities, hearing is grossly normal  Oropharynx/Mouth/Face: lips are normal with no lesions, the voice quality was normal  Neck: neck is symmetric  Pulmonary: no increased work of breathing or signs of respiratory distress  Musculoskeletal: normal station   Neurological: normal general cortical function    Lab Review:  Lab Results   Component Value Date    TSH 1.46 12/15/2017     No results found for: FREET4     ASSESSMENT/PLAN:  1. Hypothyroidism     TSH 2.4-1.9-3.1-1.3  Levothyroxine 0.112 mg 6 days a week, one and a half tablet 1 day week  - Comprehensive Metabolic Panel; Future  - TSH without Reflex; Future  - T4, Free; Future  - T3; Future    2. Hashimoto's thyroiditis  TSH, FT4, FT3.    3. Low ferritin  Improved  Normal ferritin. Can't take iron supplements. Follow for now. - Iron and TIBC; Future  - CBC; Future  - FERRITIN; Future    4. Hypercalcemia  2 glasses a day  1 Angie a day sometimes. Calcium 10. 6(10.5), repeated normal 9.7(10.1), 10.4, upper limit normal 10.5  PTH 31.5  25OHvitamin D 31.1-32  No MVI    Reviewed and/or ordered clinical lab results Yes  Reviewed and/or ordered radiology tests Yes   Reviewed and/or ordered other diagnostic tests No  Discussed test results with performing physician No  Independently reviewed image, tracing, or specimen No  Made a decision to obtain old records Yes  Reviewed old records Yes   Obtained history from other than patient Yes    Lodema Pack was counseled regarding symptoms of thyroid diagnosis, course and complications of disease if inadequately treated, side effects of medications, labs, imaging and other studies and treatment targets and goals, hypercalcemia, causes. She understands instructions and counseling. This was a video visit, including two-way audio and video communication, in lieu of an in-person visit due to coronavirus emergency. Patient provided verbal consent to use the video visit. I conducted an interview, performed a limited exam by video and educated the patient on my assessment and plan. Pursuant to the emergency declaration under the [facility] waiver authority and the [employer] Act, this Virtual  Visit was conducted, with patient's consent, to reduce the patient's risk of exposure to COVID-19 and provide continuity of care for an established patient. Services were provided through a video synchronous discussion virtually to substitute for in-person clinic visit. Return in about 3 months (around 6/30/2020) for thyroid problems.

## 2020-03-30 ENCOUNTER — TELEMEDICINE (OUTPATIENT)
Dept: ENDOCRINOLOGY | Age: 25
End: 2020-03-30
Payer: COMMERCIAL

## 2020-03-30 PROCEDURE — 99214 OFFICE O/P EST MOD 30 MIN: CPT | Performed by: INTERNAL MEDICINE

## 2020-03-30 RX ORDER — DEXTROAMPHETAMINE SACCHARATE, AMPHETAMINE ASPARTATE, DEXTROAMPHETAMINE SULFATE AND AMPHETAMINE SULFATE 2.5; 2.5; 2.5; 2.5 MG/1; MG/1; MG/1; MG/1
TABLET ORAL
COMMUNITY
Start: 2020-03-15

## 2020-03-31 ENCOUNTER — TELEPHONE (OUTPATIENT)
Dept: ENDOCRINOLOGY | Age: 25
End: 2020-03-31

## 2020-03-31 NOTE — TELEPHONE ENCOUNTER
PT requests a call back regarding her weight. 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  Doesn't feel she's very stressed

## 2020-06-29 ENCOUNTER — TELEMEDICINE (OUTPATIENT)
Dept: ENDOCRINOLOGY | Age: 25
End: 2020-06-29
Payer: COMMERCIAL

## 2020-06-29 PROCEDURE — 99214 OFFICE O/P EST MOD 30 MIN: CPT | Performed by: INTERNAL MEDICINE

## 2020-06-29 RX ORDER — LEVOTHYROXINE SODIUM 112 UG/1
TABLET ORAL
Qty: 100 TABLET | Refills: 1 | Status: SHIPPED | OUTPATIENT
Start: 2020-06-29 | End: 2020-12-11

## 2020-06-29 NOTE — PROGRESS NOTES
or ex partner: None     Emotionally abused: None     Physically abused: None     Forced sexual activity: None   Other Topics Concern    None   Social History Narrative    None     Current Outpatient Medications   Medication Sig Dispense Refill    levothyroxine (SYNTHROID) 112 MCG tablet 1 tablet 6 days a week, one and a half tablet 1 day a week 100 tablet 1    amphetamine-dextroamphetamine (ADDERALL) 10 MG tablet TAKE 1 TABLET BY MOUTH EVERY MORNING AND 1 TABLET AFTER LUNCH AS DIRECTED      Levonorgest-Eth Estrad 91-Day 0.1-0.02 & 0.01 MG TABS TAKE 1 TABLET BY MOUTH DAILY FOR 91 DAYS  1     No current facility-administered medications for this visit.       No Known Allergies  Family Status   Relation Name Status    Mother  Alive    Father  Alive       Review of Systems:  Constitutional: has fatigue, no fever, no recent weight gain, no recent weight loss, no changes in appetite  Eyes: no eye pain, no change in vision, no eye redness, no eye irritation, no double vision, sometimes blurry vision  Ears, nose, throat: no nasal congestion, no sore throat, no earache, no decrease in hearing, no hoarseness, no dry mouth, has sinus problems, no difficulty swallowing, no neck lumps, no dental problems, no mouth sores, no ringing in ears  Pulmonary: no shortness of breath, no wheezing, no dyspnea on exertion, no cough  Cardiovascular: no chest pain, no lower extremity edema, no orthopnea, no palpitations  Gastrointestinal: no abdominal pain, no nausea, no vomiting, no diarrhea, no constipation, no heartburn, no bloating  Genitourinary: no dysuria, no urinary incontinence, no urinary hesitancy, no change in urinary frequency, no feelings of urinary urgency, no nocturia  Musculoskeletal: no joint swelling, no joint stiffness, no joint pain, no muscle cramps, no muscle pain, no bone pain, had finger fracture  Integument/Breast: no hair loss, no skin rashes, no skin lesions  Neurological: no numbness, no tingling, no weakness, no confusion, has headaches, no dizziness, no fainting, no tremors, no decrease in memory, no balance problems  Psychiatric: no anxiety, no depression, no insomnia  Hematologic/Lymphatic: no tendency for easy bleeding, no swollen lymph nodes, no tendency for easy bruising  Immunology: has seasonal allergies, no frequent infections, no frequent illnesses  Endocrine: has cold temperature intolerance, no hot flashes, no hand tremor    OBJECTIVE:  Constitutional: no apparent distress, well developed and well nourished  Mental status: alert and awake, oriented to person, place and time, able to follow commands  Psychiatric: judgement and insight and normal, recent and remote memory are intact, mood and affect are normal  Skin: skin inspection appears normal, no significant exanthematous lesions or discoloration noted on facial skin  Head and Face: head and face inspection revealed no abnormalities, normocephalic, atraumatic  Eyes: no lid or conjunctival swelling, erythema or discharge, sclera appears normal  Ears/Nose: external inspection of ears and nose revealed no abnormalities, hearing is grossly normal  Oropharynx/Mouth/Face: lips are normal with no lesions, the voice quality was normal  Neck: neck is symmetric, no visualized mass  Pulmonary/chest: respiratory effort normal, no generalized signs of difficulty breathing or signs of respiratory distress  Musculoskeletal: normal station, normal range of motion of neck  Neurological: no facial asymmetry, normal general cortical function    Lab Review:  Lab Results   Component Value Date    TSH 1.350 03/23/2020     No results found for: FREET4     ASSESSMENT/PLAN:  1. Hypothyroidism     TSH 2.4-1.9-3.1-1.3-0.8  Continue levothyroxine 0.112 mg 6 days a week, one and a half tablet 1 day week  - Comprehensive Metabolic Panel; Future  - TSH without Reflex; Future  - T4, Free; Future  - T3; Free    2.  Hashimoto's thyroiditis  TSH, FT4, FT3.    3. Low ferritin  Low normal ferritin  Ferritin 20  Can't take iron supplements. Follow for now. - Iron and TIBC; Future  - CBC; Future  - FERRITIN; Future    4. Hypercalcemia  Drinks milk  1 Angie a day sometimes. Calcium 10. 6(10.5), repeated normal 9.7(10.1), 10.4, upper limit normal 10.5  PTH 31.5  25OHvitamin D 31.1-32-41  No MVI    Reviewed and/or ordered clinical lab results Yes  Reviewed and/or ordered radiology tests Yes   Reviewed and/or ordered other diagnostic tests No  Discussed test results with performing physician No  Independently reviewed image, tracing, or specimen No  Made a decision to obtain old records Yes  Reviewed old records Yes   Obtained history from other than patient Yes    Katie Smith is a 23 y.o. female being evaluated by a Virtual Visit (video visit) encounter, including two-way audio and video communication, in lieu of an in-person visit due to coronavirus emergency, to address concerns as mentioned in history and assessment and plan. Patient identification was verified at the start of the visit. I conducted an interview, performed a limited exam by video and educated the patient on my assessment and plan. Due to this being a TeleHealth encounter (During QCIYI-01 public health emergency), evaluation of the following organ systems was limited: Vitals/Constitutional/EENT/Resp/CV/GI//MS/Neuro/Skin/Heme-Lymph-Imm. Pursuant to the emergency declaration under the [address] [place] [address], [address] [place] [address] and the [employer] Act, this Virtual Visit was conducted with patient's (and/or legal guardian's) consent, to reduce the patient's risk of exposure to COVID-19 and provide necessary medical care. The patient (and/or legal guardian) has also been advised to contact this office for worsening conditions or problems, and seek emergency medical treatment and/or call 911 if deemed necessary.     Total time spent on this encounter via Telehealth (synchronous, real-time audio/visual connection): 25 min  See assessment, plan and counseling note for counseling and care coordination details. Services were provided through a video synchronous discussion virtually to substitute for in-person clinic visit. Persons participating in the telehealth service: provider - Wilian Arita MD and patient Madeline Monroe. Provider was located at her office. Patient was located at home. --Wilian Arita MD on 6/29/2020 at 11:03 AM    An electronic signature was used to authenticate this note. Return in about 6 months (around 12/29/2020) for thyroid problems.

## 2020-07-10 ENCOUNTER — TELEPHONE (OUTPATIENT)
Dept: ENDOCRINOLOGY | Age: 25
End: 2020-07-10

## 2020-07-11 NOTE — TELEPHONE ENCOUNTER
Inform patient that her platelet count was elevated. Ask her to discuss this with her family doctor. Please fax the CBC results to family doctor. Thank you.

## 2020-07-13 NOTE — TELEPHONE ENCOUNTER
Results faxed to PCP. Pt notified to contact PCP because of elevated platelet count. Pt stated her understanding.

## 2020-07-14 ENCOUNTER — TELEPHONE (OUTPATIENT)
Dept: ENDOCRINOLOGY | Age: 25
End: 2020-07-14

## 2020-07-14 NOTE — TELEPHONE ENCOUNTER
Please inform patient that her TSH was 0.8, very good. Vitamin D was very good at 41. She has good iron, blood cell count and ferritin. She has good chemistry panel other than a glucose was a little low 62.

## 2020-12-11 ENCOUNTER — OFFICE VISIT (OUTPATIENT)
Dept: ENDOCRINOLOGY | Age: 25
End: 2020-12-11
Payer: COMMERCIAL

## 2020-12-11 VITALS
BODY MASS INDEX: 17.16 KG/M2 | HEART RATE: 96 BPM | DIASTOLIC BLOOD PRESSURE: 64 MMHG | HEIGHT: 65 IN | WEIGHT: 103 LBS | RESPIRATION RATE: 14 BRPM | SYSTOLIC BLOOD PRESSURE: 110 MMHG

## 2020-12-11 PROCEDURE — 99214 OFFICE O/P EST MOD 30 MIN: CPT | Performed by: INTERNAL MEDICINE

## 2020-12-11 RX ORDER — LEVOTHYROXINE SODIUM 0.12 MG/1
TABLET ORAL
Qty: 90 TABLET | Refills: 1 | Status: SHIPPED | OUTPATIENT
Start: 2020-12-11 | End: 2021-07-19

## 2020-12-11 NOTE — PROGRESS NOTES
SUBJECTIVE:  Mono Lee is a 23 y.o. female who is here for hypothyroidism. 1. Hypothyroidism     This started in 2015. Patient was diagnosed with hypothyroidism. The problem has been unchanged. Patient started medication in 2015. Currently patient is on: levothyroxine. Misses  0 doses a month. Current complaints: fatigue, feeling cold and cold intolerance, dry skin. Feels flushing in her arms and legs  Has lightheadness and shakiness if does not eat for longer time. History of obstructive symptoms: difficulty swallowing No, changes in voice/hoarseness No.  History of radiation to patient's neck: No  Resent iodine exposure: No  Family history includes goiter. Family history of thyroid cancer: No    [place] did not work, TSH was high. 2. Hashimoto's thyroiditis  Has fatigue. 3. Low ferritin  Has fatigue. Worse in morning, afternoon. Moderate. Can not take supplement. Improved. 4. Hypercalcemia  No numbness, tingling  2 glasses of milk a day  1 Angie a day sometimes. Calcium 10. 6(10.5), repeated normal 9.7(10.1)  No MVI    Result Narrative   [address],   [id number]    [facility]             PATIENT NAME: [address] [place] [address], [address] [place] [address] [place] 1995                                            ACCT:    [de-identified]  [address], [facility]  [id number]:         [id number]  -----------------------------------------------------------------------------  THYROID ECHOGRAPHY US [facility]             ORDERING DOCTOR:    ROSALBA FALCON  ALSO INCLUDES ORDER #(S):        -----------------------------------------------------------------------------  Thyroid ultrasound: 8/12/2015    Clinical History:  Goiter    Comparison:  None.     Findings:  The thyroid gland is diffusely heterogenous in echotexture  and hyperemic.  No nodule or cyst is identified.   The right thyroid  lobe measures approximately 4.2 x 1.4 x 1 cm. The left thyroid lobe  measures approximately 3.9 x 1 x 1.2 cm.  These measurements are within  the range of normal for age.   No cervical adenopathy is seen.           Past Medical History:   Diagnosis Date    GERD (gastroesophageal reflux disease)     Hypothyroidism     Irritable bowel syndrome      Patient Active Problem List    Diagnosis Date Noted    Hypercalcemia 11/26/2019    Acquired hypothyroidism 12/15/2017    Hashimoto's thyroiditis 12/15/2017    Low ferritin 12/15/2017     Past Surgical History:   Procedure Laterality Date    COLONOSCOPY      UPPER GASTROINTESTINAL ENDOSCOPY       Family History   Problem Relation Age of Onset    No Known Problems Mother     High Cholesterol Father     Thyroid Disease Father      Social History     Socioeconomic History    Marital status: Single     Spouse name: None    Number of children: None    Years of education: None    Highest education level: None   Occupational History    None   Social Needs    Financial resource strain: None    Food insecurity     Worry: None     Inability: None    Transportation needs     Medical: None     Non-medical: None   Tobacco Use    Smoking status: Never Smoker    Smokeless tobacco: Never Used   Substance and Sexual Activity    Alcohol use: No    Drug use: No    Sexual activity: Never   Lifestyle    Physical activity     Days per week: None     Minutes per session: None    Stress: None   Relationships    Social connections     Talks on phone: None     Gets together: None     Attends Nondenominational service: None     Active member of club or organization: None     Attends meetings of clubs or organizations: None     Relationship status: None    Intimate partner violence     Fear of current or ex partner: None     Emotionally abused: None     Physically abused: None     Forced sexual activity: None   Other Topics Concern    None   Social History Narrative    None     Current Outpatient Medications   Medication Sig Dispense Refill    levothyroxine (SYNTHROID) 125 MCG tablet 1 tablet daily 90 tablet 1    Levonorgest-Eth Estrad 91-Day 0.1-0.02 & 0.01 MG TABS TAKE 1 TABLET BY MOUTH DAILY FOR 91 DAYS  1    amphetamine-dextroamphetamine (ADDERALL) 10 MG tablet TAKE 1 TABLET BY MOUTH EVERY MORNING AND 1 TABLET AFTER LUNCH AS DIRECTED       No current facility-administered medications for this visit.       No Known Allergies  Family Status   Relation Name Status    Mother  Alive    Father  Alive       Review of Systems:  Constitutional: has fatigue, no fever, no recent weight gain, no recent weight loss, no changes in appetite  Eyes: no eye pain, no change in vision, no eye redness, no eye irritation, no double vision, sometimes blurry vision  Ears, nose, throat: no nasal congestion, no sore throat, no earache, no decrease in hearing, no hoarseness, no dry mouth, has sinus problems, no difficulty swallowing, no neck lumps, no dental problems, no mouth sores, no ringing in ears  Pulmonary: no shortness of breath, no wheezing, no dyspnea on exertion, no cough  Cardiovascular: no chest pain, no lower extremity edema, no orthopnea, no palpitations  Gastrointestinal: no abdominal pain, no nausea, no vomiting, no diarrhea, no constipation, no heartburn, no bloating  Genitourinary: no dysuria, no urinary incontinence, no urinary hesitancy, no change in urinary frequency, no feelings of urinary urgency, no nocturia  Musculoskeletal: no joint swelling, no joint stiffness, no joint pain, no muscle cramps, no muscle pain, no bone pain, had finger fracture  Integument/Breast: no hair loss, no skin rashes, no skin lesions  Neurological: no numbness, no tingling, no weakness, no confusion, has headaches, no dizziness, no fainting, no tremors, no decrease in memory, no balance problems  Psychiatric: no anxiety, no depression, no insomnia  Hematologic/Lymphatic: no tendency for easy bleeding, no swollen lymph nodes, no tendency for easy bruising  Immunology: no seasonal allergies, no frequent infections, no frequent illnesses  Endocrine: has cold temperature intolerance, no hot flashes, no hand tremor    OBJECTIVE:   /64   Pulse 96   Resp 14   Ht 5' 5\" (1.651 m)   Wt 103 lb (46.7 kg)   LMP 11/18/2020   BMI 17.14 kg/m²   Wt Readings from Last 3 Encounters:   12/11/20 103 lb (46.7 kg)   11/27/19 108 lb (49 kg)   06/28/19 109 lb 12.8 oz (49.8 kg)       Physical Exam:  Constitutional: no acute distress, well appearing, well nourished  Psychiatric: oriented to person, place and time, judgement, insight and normal, recent and remote memory and intact and mood, affect are normal  Skin: skin and subcutaneous tissue is normal without mass, normal turgor  Head and Face: examination of head and face revealed no abnormalities  Eyes: no lid or conjunctival swelling, no erythema or discharge, pupils are normal, equal, round, and reactive to light  Ears/Nose: external inspection of ears and nose revealed no abnormalities, hearing is grossly normal  Oropharynx/Mouth/Face: lips, tongue and gums are normal with no lesions, the voice quality was normal  Neck: neck is supple and symmetric, with midline trachea and no masses, thyroid is normal  Lymphatics: normal cervical lymph nodes, normal supraclavicular nodes  Pulmonary: no increased work of breathing or signs of respiratory distress, lungs are clear to auscultation  Cardiovascular: normal heart rate and rhythm, normal S1 and S2, no murmurs and pedal pulses and 2+ bilaterally, No edema  Abdomen: abdomen is soft, non-tender with no masses  Musculoskeletal: normal gait and station, exam of the digits and nails are normal  Neurological: normal coordination, normal general cortical function    Lab Review:  Lab Results   Component Value Date    TSH 5.560 12/07/2020     No results found for: FREET4     ASSESSMENT/PLAN:  1.  Hypothyroidism   On new BCP  Worse, uncontrolled. TSH 2.4-1.9-3.1-5.5  Increase Levothyroxine to 0.125 mg daily  - Comprehensive Metabolic Panel; Future  - TSH without Reflex; Future  - T4, Free; Future  - T3; Future    2. Hashimoto's thyroiditis  TSH, FT4, FT3.    3. Low ferritin  Low normal ferritin. Can't take iron supplements. Follow for now. - Iron and TIBC; Future  - CBC; Future  - FERRITIN; Future    4. Hypercalcemia  2 glasses a day  1 Angie a day sometimes. Calcium 10. 6(10.5), repeated normal 9.7(10.1)-10.1(19.5)  PTH 31.5-34  25OHvitamin D 31.1-32  No MVI    Reviewed and/or ordered clinical lab results Yes  Reviewed and/or ordered radiology tests Yes   Reviewed and/or ordered other diagnostic tests No  Discussed test results with performing physician No  Independently reviewed image, tracing, or specimen No  Made a decision to obtain old records Yes  Reviewed old records Yes   Obtained history from other than patient Yes    Mono Lee was counseled regarding symptoms of thyroid diagnosis, course and complications of disease if inadequately treated, side effects of medications, diagnosis, treatment options, and prognosis, risks, benefits, complications, and alternatives of treatment, labs, imaging and other studies and treatment targets and goals, hypercalcemia, work up, causes. She understands instructions and counseling. Return in about 5 months (around 5/11/2021) for thyroid problems.

## 2021-07-19 ENCOUNTER — VIRTUAL VISIT (OUTPATIENT)
Dept: ENDOCRINOLOGY | Age: 26
End: 2021-07-19
Payer: COMMERCIAL

## 2021-07-19 DIAGNOSIS — E06.3 HASHIMOTO'S THYROIDITIS: ICD-10-CM

## 2021-07-19 DIAGNOSIS — E83.52 HYPERCALCEMIA: ICD-10-CM

## 2021-07-19 DIAGNOSIS — R79.0 LOW FERRITIN: ICD-10-CM

## 2021-07-19 DIAGNOSIS — E03.9 ACQUIRED HYPOTHYROIDISM: Primary | ICD-10-CM

## 2021-07-19 PROCEDURE — 99214 OFFICE O/P EST MOD 30 MIN: CPT | Performed by: INTERNAL MEDICINE

## 2021-07-19 RX ORDER — PROPRANOLOL HYDROCHLORIDE 20 MG/1
TABLET ORAL
COMMUNITY
Start: 2021-05-12

## 2021-07-19 RX ORDER — LEVOTHYROXINE SODIUM 0.12 MG/1
TABLET ORAL
Qty: 90 TABLET | Refills: 1 | Status: SHIPPED | OUTPATIENT
Start: 2021-07-19 | End: 2022-02-01

## 2021-07-19 NOTE — PROGRESS NOTES
SUBJECTIVE:  Meghann Najera is a 27 y.o. female who is here for hypothyroidism. 2021    TELEHEALTH EVALUATION -- Audio/Visual (During OBGPB-08 public health emergency)    Patient provided verbal consent to use the video visit. HPI:    Meghann Najera (:  1995) has requested an audio/video evaluation for the following concern(s):      1. Hypothyroidism     This started in . Patient was diagnosed with hypothyroidism. The problem has been unchanged. Patient started medication in . Currently patient is on: levothyroxine. Misses  0 doses a month. Current complaints: fatigue, feeling cold and cold intolerance, dry skin. Feels flushing in her arms and legs  Has lightheadness and shakiness if does not eat for longer time. History of obstructive symptoms: difficulty swallowing No, changes in voice/hoarseness No.  History of radiation to patient's neck: No  Resent iodine exposure: No  Family history includes goiter. Family history of thyroid cancer: No    [place] did not work, TSH was high. 2. Hashimoto's thyroiditis  Has fatigue. 3. Low ferritin  Has fatigue. Worse in morning, afternoon. Moderate. Can not take supplement. 4. Hypercalcemia  No numbness, tingling  2 glasses of milk a day  1 Angie a day sometimes.   No MVI    Result Narrative   [address], [place]  [id number]    [facility]             PATIENT NAME: [address] [place] [address], [address] [place] [address] [place] 1995                                            ACCT:    [de-identified]  [address], [address]  [id number]:         70  -----------------------------------------------------------------------------  THYROID ECHOGRAPHY [facility]             ORDERING DOCTOR:    ROSALBA FALCON  ALSO INCLUDES ORDER #(S):        -----------------------------------------------------------------------------  Thyroid ultrasound: 8/12/2015    Clinical History:  Goiter    Comparison:  None. Findings:  The thyroid gland is diffusely heterogenous in echotexture  and hyperemic.  No nodule or cyst is identified.   The right thyroid  lobe measures approximately 4.2 x 1.4 x 1 cm. The left thyroid lobe  measures approximately 3.9 x 1 x 1.2 cm. These measurements are within  the range of normal for age.   No cervical adenopathy is seen.           Past Medical History:   Diagnosis Date    GERD (gastroesophageal reflux disease)     Hypothyroidism     Irritable bowel syndrome      Patient Active Problem List    Diagnosis Date Noted    Hypercalcemia 11/26/2019    Acquired hypothyroidism 12/15/2017    Hashimoto's thyroiditis 12/15/2017    Low ferritin 12/15/2017     Past Surgical History:   Procedure Laterality Date    COLONOSCOPY      UPPER GASTROINTESTINAL ENDOSCOPY       Family History   Problem Relation Age of Onset    No Known Problems Mother     High Cholesterol Father     Thyroid Disease Father      Social History     Socioeconomic History    Marital status: Single     Spouse name: None    Number of children: None    Years of education: None    Highest education level: None   Occupational History    None   Tobacco Use    Smoking status: Never Smoker    Smokeless tobacco: Never Used   Substance and Sexual Activity    Alcohol use: No    Drug use: No    Sexual activity: Never   Other Topics Concern    None   Social History Narrative    None     Social Determinants of Health     Financial Resource Strain:     Difficulty of Paying Living Expenses:    Food Insecurity:     Worried About Running Out of Food in the Last Year:     Ran Out of Food in the Last Year:    Transportation Needs:     Lack of Transportation (Medical):      Lack of Transportation (Non-Medical):    Physical Activity:     Days of Exercise per Week:     Minutes of Exercise per Session:    Stress:     Feeling of Stress :    Social Connections:     nocturia  Musculoskeletal: no joint swelling, no joint stiffness, no joint pain, no muscle cramps, no muscle pain, no bone pain, had finger fracture  Integument/Breast: no hair loss, no skin rashes, no skin lesions  Neurological: no numbness, no tingling, no weakness, no confusion, has headaches, no dizziness, no fainting, no tremors, no decrease in memory, no balance problems  Psychiatric: no anxiety, no depression, no insomnia  Hematologic/Lymphatic: no tendency for easy bleeding, no swollen lymph nodes, no tendency for easy bruising  Immunology: no seasonal allergies, no frequent infections, no frequent illnesses  Endocrine: has cold temperature intolerance, no hot flashes, no hand tremor    OBJECTIVE:   There were no vitals taken for this visit.   Wt Readings from Last 3 Encounters:   12/11/20 103 lb (46.7 kg)   11/27/19 108 lb (49 kg)   06/28/19 109 lb 12.8 oz (49.8 kg)       OBJECTIVE:  Constitutional: no apparent distress, well developed and well nourished  Mental status: alert and awake, oriented to person, place and time, able to follow commands  Psychiatric: judgement and insight and normal, recent and remote memory are intact, mood and affect are normal  Skin: skin inspection appears normal, no significant exanthematous lesions or discoloration noted on facial skin  Head and Face: head and face inspection revealed no abnormalities, normocephalic, atraumatic  Eyes: no lid or conjunctival swelling, erythema or discharge, sclera appears normal  Ears/Nose: external inspection of ears and nose revealed no abnormalities, hearing is grossly normal  Oropharynx/Mouth/Face: lips are normal with no lesions, the voice quality was normal  Neck: neck is symmetric, no visualized mass  Pulmonary/chest: respiratory effort normal, no generalized signs of difficulty breathing or signs of respiratory distress  Musculoskeletal: normal station, normal range of motion of neck  Neurological: no facial asymmetry, normal general cortical function      Lab Review:  Lab Results   Component Value Date    TSH 5.560 12/07/2020     No results found for: FREET4     ASSESSMENT/PLAN:  1. Hypothyroidism     TSH 2.4-1.9-3.15.50-0.337  Continue levothyroxine 0.125 mg daily  - Comprehensive Metabolic Panel; Future  - TSH without Reflex; Future  - T4, Free; Future  - T3; Future    2. Hashimoto's thyroiditis  TSH, FT4, FT3.    3. Low ferritin  Low normal ferritin. Can't take iron supplements. Follow for now. - Iron and TIBC; Future  - CBC; Future  - FERRITIN; Future    4. Hypercalcemia  2 glasses of milk a day  1 Angie a day sometimes. Calcium 10. 6(10.5), repeated normal 9.7(10.1)-10. 1(10.5)-9.9(10.5)  PTH 31.5-34  25OHvitamin D 31.1-32-42  No MVI    Patient recently saw a rheumatologist.  She was diagnosed with MCTD    Reviewed and/or ordered clinical lab results Yes  Reviewed and/or ordered radiology tests Yes   Reviewed and/or ordered other diagnostic tests No  Discussed test results with performing physician No  Independently reviewed image, tracing, or specimen No  Made a decision to obtain old records Yes  Reviewed old records Yes   Obtained history from other than patient Yes    Mono Lee was counseled regarding symptoms of thyroid diagnosis, course and complications of disease if inadequately treated, side effects of medications, diagnosis, treatment options, and prognosis, risks, benefits, complications, and alternatives of treatment, labs, imaging and other studies and treatment targets and goals, hypercalcemia, work up, causes. She understands instructions and counseling. Mono Lee is a 27 y.o. female being evaluated by a Virtual Visit (video visit) encounter, including two-way audio and video communication, in lieu of an in-person visit due to coronavirus emergency, to address concerns as mentioned in history and assessment and plan. Patient identification was verified at the start of the visit.     I conducted an interview, performed a limited exam by video and educated the patient on my assessment and plan. Due to this being a TeleHealth encounter (During QNNNU-49 public health emergency), evaluation of the following organ systems was limited: Vitals/Constitutional/EENT/Resp/CV/GI//MS/Neuro/Skin/Heme-Lymph-Imm. Pursuant to the emergency declaration under the [address] [place] [address] and the [employer] Act, this Virtual Visit was conducted with patient's (and/or legal guardian's) consent, to reduce the patient's risk of exposure to COVID-19 and provide necessary medical care. The patient (and/or legal guardian) has also been advised to contact this office for worsening conditions or problems, and seek emergency medical treatment and/or call 911 if deemed necessary. Total time spent on this encounter via Telehealth (synchronous, real-time audio/visual connection): 30 min    See assessment, plan and counseling note for counseling and care coordination details. Services were provided through a video synchronous discussion virtually to substitute for in-person clinic visit. Persons participating in the telehealth service: provider - Kaitlin Matthew MD and patient Meghann Najera. Provider was located at her office. Patient was located at home. --Kaitlin Matthew MD on 7/19/2021 at 4:59 PM    An electronic signature was used to authenticate this note. Return in about 3 months (around 10/19/2021) for thyroid problems.

## 2022-02-04 ENCOUNTER — PATIENT MESSAGE (OUTPATIENT)
Dept: ENDOCRINOLOGY | Age: 27
End: 2022-02-04

## 2022-02-08 NOTE — TELEPHONE ENCOUNTER
From: Meredith Staff  To: Dr. Delisa Xavier: 2/4/2022 9:53 AM EST  Subject: Ish Bedoya    I have been trying to get my labwork from the office for my appointment on 2/11/22. But I have not had any luck. Can you please send it to [email or web address] or fax it to the [facility] [phone].

## 2022-02-11 ENCOUNTER — OFFICE VISIT (OUTPATIENT)
Dept: ENDOCRINOLOGY | Age: 27
End: 2022-02-11
Payer: COMMERCIAL

## 2022-02-11 VITALS
SYSTOLIC BLOOD PRESSURE: 116 MMHG | HEART RATE: 67 BPM | DIASTOLIC BLOOD PRESSURE: 68 MMHG | OXYGEN SATURATION: 98 % | RESPIRATION RATE: 14 BRPM | BODY MASS INDEX: 17.66 KG/M2 | WEIGHT: 106 LBS | HEIGHT: 65 IN | TEMPERATURE: 98 F

## 2022-02-11 DIAGNOSIS — E06.3 HASHIMOTO'S THYROIDITIS: ICD-10-CM

## 2022-02-11 DIAGNOSIS — E83.52 HYPERCALCEMIA: ICD-10-CM

## 2022-02-11 DIAGNOSIS — E03.9 ACQUIRED HYPOTHYROIDISM: Primary | ICD-10-CM

## 2022-02-11 DIAGNOSIS — R79.0 LOW FERRITIN: ICD-10-CM

## 2022-02-11 PROCEDURE — 99214 OFFICE O/P EST MOD 30 MIN: CPT | Performed by: INTERNAL MEDICINE

## 2022-02-11 RX ORDER — LEVOTHYROXINE SODIUM 0.12 MG/1
TABLET ORAL
Qty: 90 TABLET | Refills: 1 | Status: SHIPPED | OUTPATIENT
Start: 2022-02-11 | End: 2022-09-19 | Stop reason: SDUPTHER

## 2022-02-11 RX ORDER — SUMATRIPTAN 25 MG/1
25 TABLET, FILM COATED ORAL
COMMUNITY

## 2022-02-11 RX ORDER — SPIRONOLACTONE 50 MG/1
TABLET, FILM COATED ORAL
COMMUNITY
Start: 2021-12-09

## 2022-02-11 NOTE — PROGRESS NOTES
SUBJECTIVE:  Luz Morocho is a 27 y.o. female who is here for hypothyroidism. 1. Hypothyroidism     This started in 2015. Patient was diagnosed with hypothyroidism. The problem has been unchanged. Patient started medication in 2015. Currently patient is on: levothyroxine. Misses  0 doses a month. Current complaints: fatigue, feeling cold and cold intolerance, dry skin. Feels flushing in her arms and legs  Has lightheadness and shakiness if does not eat for longer time. History of obstructive symptoms: difficulty swallowing No, changes in voice/hoarseness No.  History of radiation to patient's neck: No  Resent iodine exposure: No  Family history includes goiter. Family history of thyroid cancer: No    [place] did not work, TSH was high. 2. Hashimoto's thyroiditis  Has fatigue. 3. Low ferritin  Has fatigue. Worse in morning, afternoon. Moderate. Can not take supplement. Improved. 4. Hypercalcemia  No numbness, tingling  2 glasses of milk a day  1 Angie a day sometimes. Calcium 10. 6(10.5), repeated normal 9.7(10.1)  No MVI    Result Narrative   [address],   [id number]    [facility]             PATIENT NAME: [address] [place] [address], [address] [place] [address] [place] 1995                                            ACCT:    [de-identified]  [address], [facility]  [id number]:         [id number]  -----------------------------------------------------------------------------  THYROID ECHOGRAPHY [facility]             ORDERING DOCTOR:    ROSALBA FALCON  ALSO INCLUDES ORDER #(S):        -----------------------------------------------------------------------------  Thyroid ultrasound: 8/12/2015    Clinical History:  Goiter    Comparison:  None.     Findings:  The thyroid gland is diffusely heterogenous in echotexture  and hyperemic.  No nodule or cyst is identified.   The right thyroid  lobe measures approximately 4.2 x 1.4 x 1 cm. The left thyroid lobe  measures approximately 3.9 x 1 x 1.2 cm. These measurements are within  the range of normal for age.   No cervical adenopathy is seen.           Past Medical History:   Diagnosis Date    GERD (gastroesophageal reflux disease)     Hypothyroidism     Irritable bowel syndrome      Patient Active Problem List    Diagnosis Date Noted    Hypercalcemia 11/26/2019    Acquired hypothyroidism 12/15/2017    Hashimoto's thyroiditis 12/15/2017    Low ferritin 12/15/2017     Past Surgical History:   Procedure Laterality Date    COLONOSCOPY      UPPER GASTROINTESTINAL ENDOSCOPY       Family History   Problem Relation Age of Onset    No Known Problems Mother     High Cholesterol Father     Thyroid Disease Father      Social History     Socioeconomic History    Marital status: Single     Spouse name: None    Number of children: None    Years of education: None    Highest education level: None   Occupational History    None   Tobacco Use    Smoking status: Never Smoker    Smokeless tobacco: Never Used   Substance and Sexual Activity    Alcohol use: No    Drug use: No    Sexual activity: Never   Other Topics Concern    None   Social History Narrative    None     Social Determinants of Health     Financial Resource Strain:     Difficulty of Paying Living Expenses: Not on file   Food Insecurity:     Worried About Running Out of Food in the Last Year: Not on file    Javier of Food in the Last Year: Not on file   Transportation Needs:     Lack of Transportation (Medical): Not on file    Lack of Transportation (Non-Medical):  Not on file   Physical Activity:     Days of Exercise per Week: Not on file    Minutes of Exercise per Session: Not on file   Stress:     Feeling of Stress : Not on file   Social Connections:     Frequency of Communication with Friends and Family: Not on file    Frequency of Social Gatherings with Friends and Family: Not on file  Attends Worship Services: Not on file    Active Member of Clubs or Organizations: Not on file    Attends Club or Organization Meetings: Not on file    Marital Status: Not on file   Intimate Partner Violence:     Fear of Current or Ex-Partner: Not on file    Emotionally Abused: Not on file    Physically Abused: Not on file    Sexually Abused: Not on file   Housing Stability:     Unable to Pay for Housing in the Last Year: Not on file    Number of [place] in the Last Year: Not on file    Unstable Housing in the Last Year: Not on file     Current Outpatient Medications   Medication Sig Dispense Refill    spironolactone (ALDACTONE) 50 MG tablet TAKE 1 TABLET BY MOUTH EVERY DAY      SUMAtriptan (IMITREX) 25 MG tablet Take 25 mg by mouth once as needed for Migraine      levothyroxine (SYNTHROID) 125 MCG tablet TAKE 1 TABLET BY MOUTH EVERY DAY 30 tablet 0    propranolol (INDERAL) 20 MG tablet TAKE 1 TABLET BY MOUTH TWICE A DAY      amphetamine-dextroamphetamine (ADDERALL) 10 MG tablet TAKE 1 TABLET BY MOUTH EVERY MORNING AND 1 TABLET AFTER LUNCH AS DIRECTED      Levonorgest-Eth Estrad 91-Day 0.1-0.02 & 0.01 MG TABS TAKE 1 TABLET BY MOUTH DAILY FOR 91 DAYS  1     No current facility-administered medications for this visit.      No Known Allergies  Family Status   Relation Name Status    Mother  Alive    Father  Alive       Review of Systems:  Constitutional: has fatigue, no fever, no recent weight gain, no recent weight loss, no changes in appetite  Eyes: no eye pain, no change in vision, no eye redness, no eye irritation, no double vision, sometimes blurry vision  Ears, nose, throat: no nasal congestion, no sore throat, no earache, no decrease in hearing, no hoarseness, no dry mouth, has sinus problems, no difficulty swallowing, no neck lumps, no dental problems, no mouth sores, no ringing in ears  Pulmonary: no shortness of breath, no wheezing, no dyspnea on exertion, no cough  Cardiovascular: no chest pain, no lower extremity edema, no orthopnea, no palpitations  Gastrointestinal: no abdominal pain, no nausea, no vomiting, no diarrhea, no constipation, no heartburn, no bloating  Genitourinary: no dysuria, no urinary incontinence, no urinary hesitancy, no change in urinary frequency, no feelings of urinary urgency, no nocturia  Musculoskeletal: no joint swelling, no joint stiffness, no joint pain, no muscle cramps, no muscle pain, no bone pain, had finger fracture  Integument/Breast: no hair loss, no skin rashes, no skin lesions  Neurological: no numbness, no tingling, no weakness, no confusion, has headaches, no dizziness, no fainting, no tremors, no decrease in memory, no balance problems  Psychiatric: no anxiety, no depression, no insomnia  Hematologic/Lymphatic: no tendency for easy bleeding, no swollen lymph nodes, no tendency for easy bruising  Immunology: no seasonal allergies, no frequent infections, no frequent illnesses  Endocrine: has cold temperature intolerance, no hot flashes, no hand tremor    OBJECTIVE:   /68   Pulse 67   Temp 98 °F (36.7 °C)   Resp 14   Ht 5' 5\" (1.651 m)   Wt 106 lb (48.1 kg)   SpO2 98%   BMI 17.64 kg/m²   Wt Readings from Last 3 Encounters:   02/11/22 106 lb (48.1 kg)   12/11/20 103 lb (46.7 kg)   11/27/19 108 lb (49 kg)       Physical Exam:  Constitutional: no acute distress, well appearing, well nourished  Psychiatric: oriented to person, place and time, judgement, insight and normal, recent and remote memory and intact and mood, affect are normal  Skin: skin and subcutaneous tissue is normal without mass, normal turgor  Head and Face: examination of head and face revealed no abnormalities  Eyes: no lid or conjunctival swelling, no erythema or discharge, pupils are normal, equal, round, and reactive to light  Ears/Nose: external inspection of ears and nose revealed no abnormalities, hearing is grossly normal  Oropharynx/Mouth/Face: lips, tongue and gums are normal with no lesions, the voice quality was normal  Neck: neck is supple and symmetric, with midline trachea and no masses, thyroid is normal  Lymphatics: normal cervical lymph nodes, normal supraclavicular nodes  Pulmonary: no increased work of breathing or signs of respiratory distress, lungs are clear to auscultation  Cardiovascular: normal heart rate and rhythm, normal S1 and S2, no murmurs and pedal pulses and 2+ bilaterally, No edema  Abdomen: abdomen is soft, non-tender with no masses  Musculoskeletal: normal gait and station, exam of the digits and nails are normal  Neurological: normal coordination, normal general cortical function    Lab Review:  Lab Results   Component Value Date    TSH 5.560 12/07/2020     No results found for: FREET4     ASSESSMENT/PLAN:  1. Hypothyroidism   Had Covid, has more headaches since then  On new BCP  Worse, uncontrolled. TSH 2.4-1.9-3.1-5.5-1.83  Continue Levothyroxine 0.125 mg daily  - Comprehensive Metabolic Panel; Future  - TSH without Reflex; Future  - T4, Free; Future  - T3; Future    2. Hashimoto's thyroiditis  TSH, FT4, FT3.    3. Low ferritin  Normal ferritin. Can't take iron supplements. Follow. - Iron and TIBC; Future  - CBC; Future  - FERRITIN; Future    4. Hypercalcemia  Sister has hypercalcemia, differential Dx includes [facility]  Obtain work up including 24 hour urine calcium  2 glasses a day  1 Angie a day sometimes. Calcium 10. 6(10.5), repeated normal 9.7(10.1)-10. 1(10.5)- 10.0((10.5)  PTH 31.5-34  25OHvitamin D 31.1-32-45  No MVI    Reviewed and/or ordered clinical lab results Yes  Reviewed and/or ordered radiology tests Yes   Reviewed and/or ordered other diagnostic tests No  Discussed test results with performing physician No  Independently reviewed image, tracing, or specimen No  Made a decision to obtain old records Yes  Reviewed old records Yes   Obtained history from other than patient Yes    Beatrice Donald was counseled regarding symptoms of thyroid diagnosis, course and complications of disease if inadequately treated, side effects of medications, diagnosis, treatment options, and prognosis, risks, benefits, complications, and alternatives of treatment, labs, imaging and other studies and treatment targets and goals, hypercalcemia, work up, causes. She understands instructions and counseling. Return in about 6 months (around 8/11/2022) for thyroid problems.

## 2022-02-26 DIAGNOSIS — E03.9 ACQUIRED HYPOTHYROIDISM: ICD-10-CM

## 2022-02-28 RX ORDER — LEVOTHYROXINE SODIUM 0.12 MG/1
TABLET ORAL
Qty: 30 TABLET | OUTPATIENT
Start: 2022-02-28

## 2022-09-19 ENCOUNTER — TELEPHONE (OUTPATIENT)
Dept: ENDOCRINOLOGY | Age: 27
End: 2022-09-19

## 2022-09-19 DIAGNOSIS — E03.9 ACQUIRED HYPOTHYROIDISM: ICD-10-CM

## 2022-09-19 RX ORDER — LEVOTHYROXINE SODIUM 0.12 MG/1
TABLET ORAL
Qty: 90 TABLET | Refills: 1 | Status: SHIPPED | OUTPATIENT
Start: 2022-09-19

## 2022-09-19 NOTE — TELEPHONE ENCOUNTER
Last visit 2/11/22    Next visit January 16, 2023     Pharmacy is [facility] in [place]. Requesting levothyroxine refill enough to last until January 16th.      Her call back is [phone]

## 2023-01-06 LAB
ABSOLUTE IMMATURE GRANULOCYTE: 0.1 K/UL (ref 0–0.1)
ALBUMIN/GLOBULIN RATIO: 2.5 RATIO (ref 0.8–2.6)
ALBUMIN: 4.7 G/DL (ref 3.5–5.2)
ALP BLD-CCNC: 60 U/L (ref 23–144)
ALT SERPL-CCNC: 9 U/L (ref 0–60)
AST SERPL-CCNC: 15 U/L (ref 0–55)
BASOPHILS ABSOLUTE: 0.1 K/UL (ref 0–0.3)
BASOPHILS RELATIVE PERCENT: 0.7 % (ref 0–2)
BILIRUB SERPL-MCNC: 0.3 MG/DL (ref 0–1.2)
BUN BLDV-MCNC: 16 MG/DL (ref 3–29)
BUN/CREAT BLD: 18 (ref 7–25)
CALCIUM SERPL-MCNC: 10.1 MG/DL (ref 8.5–10.5)
CALCIUM SERPL-MCNC: 10.2 MG/DL (ref 8.5–10.5)
CHLORIDE BLD-SCNC: 100 MEQ/L (ref 96–110)
CO2: 27 MEQ/L (ref 19–32)
CREAT SERPL-MCNC: 0.9 MG/DL (ref 0.5–1.2)
DIFFERENTIAL TYPE: NORMAL
EOSINOPHILS ABSOLUTE: 0.2 K/UL (ref 0–0.5)
EOSINOPHILS RELATIVE PERCENT: 2.1 % (ref 0–5)
FERRITIN: 28 NG/ML (ref 12–156)
GLOBULIN: 1.9 G/DL (ref 1.9–3.6)
GLOMERULAR FILTRATION RATE: 91 MLS/MIN/1.73M2
GLUCOSE BLD-MCNC: 76 MG/DL (ref 70–99)
HCT VFR BLD CALC: 38.7 % (ref 34–49)
HEMOGLOBIN: 13.4 G/DL (ref 11.2–15.7)
IMMATURE GRANULOCYTES: 0.6 %
IRON SATURATION: 28 % (ref 12–57)
IRON: 125 MCG/DL (ref 35–175)
LYMPHOCYTES ABSOLUTE: 3.1 K/UL (ref 0.9–4.1)
LYMPHOCYTES RELATIVE PERCENT: 37.5 % (ref 14–51)
MCH RBC QN AUTO: 30.3 PG (ref 26–34)
MCHC RBC AUTO-ENTMCNC: 34.6 G/DL (ref 30.7–35.5)
MCV RBC AUTO: 87.6 FL (ref 80–100)
MONOCYTES ABSOLUTE: 0.5 K/UL (ref 0.2–1)
MONOCYTES RELATIVE PERCENT: 5.7 % (ref 4–12)
NEUTROPHILS ABSOLUTE: 4.4 K/UL (ref 1.8–7.5)
NEUTROPHILS RELATIVE PERCENT: 53.4 % (ref 42–80)
NUCLEATED RBCS: 0 /100 WBC
PARATHYROID HORMONE INTACT: 21 PG/ML (ref 15–65)
PDW BLD-RTO: 12.1 %
PHOSPHORUS: 3.9 MG/DL (ref 2.5–4.5)
PLATELET # BLD: 392 K/UL (ref 140–400)
PMV BLD AUTO: 9.2 FL (ref 7.2–11.7)
POTASSIUM SERPL-SCNC: 4.3 MEQ/L (ref 3.4–5.3)
RBC # BLD: 4.42 M/UL (ref 3.95–5.26)
SODIUM BLD-SCNC: 136 MEQ/L (ref 135–148)
STATUS: NORMAL
T3 FREE: 2.6 PG/ML (ref 2.3–4.2)
T4 FREE: 1.23 NG/DL (ref 0.8–1.8)
TOTAL IRON BINDING CAPACITY: 444 MCG/DL (ref 200–450)
TOTAL PROTEIN: 6.6 G/DL (ref 6–8.3)
TSH SERPL DL<=0.05 MIU/L-ACNC: 2.33 MCIU/ML (ref 0.4–4.5)
WBC: 8.2 K/UL (ref 3.5–10.9)

## 2023-01-07 LAB — VITAMIN D 25-HYDROXY: 36 NG/ML (ref 30–100)

## 2023-01-16 ENCOUNTER — OFFICE VISIT (OUTPATIENT)
Dept: ENDOCRINOLOGY | Age: 28
End: 2023-01-16
Payer: COMMERCIAL

## 2023-01-16 VITALS
TEMPERATURE: 98 F | DIASTOLIC BLOOD PRESSURE: 64 MMHG | WEIGHT: 112 LBS | RESPIRATION RATE: 14 BRPM | HEART RATE: 70 BPM | OXYGEN SATURATION: 100 % | SYSTOLIC BLOOD PRESSURE: 118 MMHG | BODY MASS INDEX: 18.66 KG/M2 | HEIGHT: 65 IN

## 2023-01-16 DIAGNOSIS — E83.52 HYPERCALCEMIA: ICD-10-CM

## 2023-01-16 DIAGNOSIS — E06.3 HASHIMOTO'S THYROIDITIS: ICD-10-CM

## 2023-01-16 DIAGNOSIS — R79.0 LOW FERRITIN: ICD-10-CM

## 2023-01-16 DIAGNOSIS — E03.9 ACQUIRED HYPOTHYROIDISM: Primary | ICD-10-CM

## 2023-01-16 PROCEDURE — 99204 OFFICE O/P NEW MOD 45 MIN: CPT | Performed by: INTERNAL MEDICINE

## 2023-01-16 RX ORDER — LEVOTHYROXINE SODIUM 0.12 MG/1
TABLET ORAL
Qty: 90 TABLET | Refills: 1
Start: 2023-01-16

## 2023-01-16 RX ORDER — SERTRALINE HYDROCHLORIDE 25 MG/1
TABLET, FILM COATED ORAL
COMMUNITY
Start: 2022-11-20

## 2023-01-16 NOTE — PROGRESS NOTES
SUBJECTIVE:  Bertha Tracy is a 24 y.o. female who is here for hypothyroidism. 1. Hypothyroidism     This started in 2015. Patient was diagnosed with hypothyroidism. The problem has been unchanged. Patient started medication in 2015. Currently patient is on: levothyroxine. Misses  0 doses a month. Current complaints: fatigue, feeling cold and cold intolerance, dry skin. Feels flushing in her arms and legs  Has lightheadness and shakiness if does not eat for longer time. History of obstructive symptoms: difficulty swallowing No, changes in voice/hoarseness No.  History of radiation to patient's neck: No  Resent iodine exposure: No  Family history includes goiter. Family history of thyroid cancer: No    [place] did not work, TSH was high. 2. Hashimoto's thyroiditis  Has fatigue. 3. Low ferritin  Has fatigue. Worse in morning, afternoon. Moderate. Can not take supplement. Improved. 4. Hypercalcemia  No numbness, tingling  2 glasses of milk a day  1 Angie a day sometimes. Calcium 10. 6(10.5), repeated normal 9.7(10.1)  No MVI    Result Narrative   [address], [address] [facility]             PATIENT NAME: Mere Montes                   ORDER:  [id number]  YOB: 1995                                            ACCT:    [de-identified]  DOCTOR:            Shana Rivas                               MR:  [id number]  LOCATION:         80  -----------------------------------------------------------------------------  THYROID ECHOGRAPHY US [facility]             ORDERING DOCTOR:    ROSALBA FALCON  ALSO INCLUDES ORDER #(S):        -----------------------------------------------------------------------------  Thyroid ultrasound: 8/12/2015    Clinical History:  Goiter    Comparison:  None. Findings: The thyroid gland is diffusely heterogenous in echotexture  and hyperemic. No nodule or cyst is identified.    The right thyroid  lobe measures approximately 4.2 x 1.4 x 1 cm. The left thyroid lobe  measures approximately 3.9 x 1 x 1.2 cm. These measurements are within  the range of normal for age. No cervical adenopathy is seen. Past Medical History:   Diagnosis Date    GERD (gastroesophageal reflux disease)     Hypothyroidism     Irritable bowel syndrome      Patient Active Problem List    Diagnosis Date Noted    Hypercalcemia 11/26/2019    Acquired hypothyroidism 12/15/2017    Hashimoto's thyroiditis 12/15/2017    Low ferritin 12/15/2017     Past Surgical History:   Procedure Laterality Date    COLONOSCOPY      UPPER GASTROINTESTINAL ENDOSCOPY       Family History   Problem Relation Age of Onset    No Known Problems Mother     High Cholesterol Father     Thyroid Disease Father      Social History     Socioeconomic History    Marital status: Single     Spouse name: None    Number of children: None    Years of education: None    Highest education level: None   Tobacco Use    Smoking status: Never    Smokeless tobacco: Never   Substance and Sexual Activity    Alcohol use: No    Drug use: No    Sexual activity: Never     Current Outpatient Medications   Medication Sig Dispense Refill    sertraline (ZOLOFT) 25 MG tablet TAKE 1 TABLET BY MOUTH EVERY DAY      levothyroxine (SYNTHROID) 125 MCG tablet TAKE 1 TABLET 6 DAYS A WEEK, 1/2 TABLET 1 DAY A WEEK. 90 tablet 1    spironolactone (ALDACTONE) 50 MG tablet TAKE 1 TABLET BY MOUTH EVERY DAY      SUMAtriptan (IMITREX) 25 MG tablet Take 25 mg by mouth once as needed for Migraine      amphetamine-dextroamphetamine (ADDERALL) 10 MG tablet TAKE 1 TABLET BY MOUTH EVERY MORNING AND 1 TABLET AFTER LUNCH AS DIRECTED      Levonorgest-Eth Estrad 91-Day 0.1-0.02 & 0.01 MG TABS TAKE 1 TABLET BY MOUTH DAILY FOR 91 DAYS  1     No current facility-administered medications for this visit.      Allergies   Allergen Reactions    Sulfa Antibiotics Rash     Family Status   Relation Name Status    Mother  Alive    Father Alive       Review of Systems:  Constitutional: has fatigue, no fever, no recent weight gain, no recent weight loss, no changes in appetite  Eyes: no eye pain, no change in vision, no eye redness, no eye irritation, no double vision, sometimes blurry vision  Ears, nose, throat: no nasal congestion, no sore throat, no earache, no decrease in hearing, no hoarseness, no dry mouth, has sinus problems, no difficulty swallowing, no neck lumps, no dental problems, no mouth sores, no ringing in ears  Pulmonary: no shortness of breath, no wheezing, no dyspnea on exertion, no cough  Cardiovascular: no chest pain, no lower extremity edema, no orthopnea, no palpitations  Gastrointestinal: no abdominal pain, no nausea, no vomiting, no diarrhea, no constipation, no heartburn, no bloating  Genitourinary: no dysuria, no urinary incontinence, no urinary hesitancy, no change in urinary frequency, no feelings of urinary urgency, no nocturia  Musculoskeletal: no joint swelling, no joint stiffness, no joint pain, no muscle cramps, no muscle pain, no bone pain, had finger fracture  Integument/Breast: no hair loss, no skin rashes, no skin lesions  Neurological: no numbness, no tingling, no weakness, no confusion, has headaches, no dizziness, no fainting, no tremors, no decrease in memory, no balance problems  Psychiatric: no anxiety, no depression, no insomnia  Hematologic/Lymphatic: no tendency for easy bleeding, no swollen lymph nodes, no tendency for easy bruising  Immunology: no seasonal allergies, no frequent infections, no frequent illnesses  Endocrine: has cold temperature intolerance, no hot flashes, no hand tremor    OBJECTIVE:   /64   Pulse 70   Temp 98 °F (36.7 °C)   Resp 14   Ht 5' 5\" (1.651 m)   Wt 112 lb (50.8 kg)   SpO2 100%   BMI 18.64 kg/m²   Wt Readings from Last 3 Encounters:   01/16/23 112 lb (50.8 kg)   02/11/22 106 lb (48.1 kg)   12/11/20 103 lb (46.7 kg)       Physical Exam:  Constitutional: no acute distress, well appearing, well nourished  Psychiatric: oriented to person, place and time, judgement, insight and normal, recent and remote memory and intact and mood, affect are normal  Skin: skin and subcutaneous tissue is normal without mass, normal turgor  Head and Face: examination of head and face revealed no abnormalities  Eyes: no lid or conjunctival swelling, no erythema or discharge, pupils are normal, equal, round, and reactive to light  Ears/Nose: external inspection of ears and nose revealed no abnormalities, hearing is grossly normal  Oropharynx/Mouth/Face: lips, tongue and gums are normal with no lesions, the voice quality was normal  Neck: neck is supple and symmetric, with midline trachea and no masses, thyroid is normal  Lymphatics: normal cervical lymph nodes, normal supraclavicular nodes  Pulmonary: no increased work of breathing or signs of respiratory distress, lungs are clear to auscultation  Cardiovascular: normal heart rate and rhythm, normal S1 and S2, no murmurs and pedal pulses and 2+ bilaterally, No edema  Abdomen: abdomen is soft, non-tender with no masses  Musculoskeletal: normal gait and station, exam of the digits and nails are normal  Neurological: normal coordination, normal general cortical function    Lab Review:  Lab Results   Component Value Date/Time    TSH 2.330 01/06/2023 12:04 PM     No results found for: FREET4     ASSESSMENT/PLAN:  1. Hypothyroidism   Had Covid, has more headaches since then  On BCP  PCP decreased the dose when TSH 0.367. Skips 1 day a week.  Hair loss worse, weight gain worse.  Less anxiety, but has insomnia.  TSH 2.4-1.9-3.1-5.5-1.83-5.56-0.367-2.33  Increase Levothyroxine to 0.125 mg 6 days weekly, 1/2 tablet 1 day a week  - Comprehensive Metabolic Panel; Future  - TSH without Reflex; Future  - T4, Free; Future  - T3; Future    2. Hashimoto's thyroiditis  TSH, FT4, FT3.    3. Low ferritin  Ferritin 28, low side of normal.  Iron 125  Can't take iron  supplements. Follow. - Iron and TIBC; Future  - CBC; Future  - FERRITIN; Future    4. Hypercalcemia  Sister has hypercalcemia, differential Dx includes [address]  Obtain work up including 24 hour urine calcium  2 glasses of milk a day  1 Angie a day sometimes. Not on multivitamin. Calcium 10. 6(10.5), repeated normal 9.7(10.1)-10. 1(10.5)- 10.0((10.5)-10.2 (10.5)  PTH 31.5-34  25OHvitamin D 31.1-32-45-36    Ionized calcium normal 5.1  Urine protein electrophoresis normal    Reviewed and/or ordered clinical lab results Yes  Reviewed and/or ordered radiology tests Yes   Reviewed and/or ordered other diagnostic tests No  Discussed test results with performing physician No  Independently reviewed image, tracing, or specimen No  Made a decision to obtain old records Yes  Reviewed old records Yes   Obtained history from other than patient Yes    [employer] was counseled regarding symptoms of thyroid diagnosis, course and complications of disease if inadequately treated, side effects of medications, diagnosis, treatment options, and prognosis, risks, benefits, complications, and alternatives of treatment, labs, imaging and other studies and treatment targets and goals, hypercalcemia, work up, causes. She understands instructions and counseling. Return in about 3 months (around 4/16/2023) for thyroid problems.

## 2023-04-04 DIAGNOSIS — E03.9 ACQUIRED HYPOTHYROIDISM: ICD-10-CM

## 2023-04-04 RX ORDER — LEVOTHYROXINE SODIUM 0.12 MG/1
TABLET ORAL
Qty: 90 TABLET | Refills: 0 | Status: SHIPPED | OUTPATIENT
Start: 2023-04-04 | End: 2023-05-08

## 2023-05-03 LAB
ALBUMIN/GLOBULIN RATIO: 1.9 RATIO (ref 0.8–2.6)
ALBUMIN: 4.6 G/DL (ref 3.5–5.2)
ALP BLD-CCNC: 65 U/L (ref 23–144)
ALT SERPL-CCNC: 10 U/L (ref 0–60)
AST SERPL-CCNC: 18 U/L (ref 0–55)
BILIRUB SERPL-MCNC: 0.3 MG/DL (ref 0–1.2)
BUN BLDV-MCNC: 12 MG/DL (ref 3–29)
BUN/CREAT BLD: 15 (ref 7–25)
CALCIUM SERPL-MCNC: 10.1 MG/DL (ref 8.5–10.5)
CHLORIDE BLD-SCNC: 102 MEQ/L (ref 96–110)
CO2: 24 MEQ/L (ref 19–32)
CREAT SERPL-MCNC: 0.8 MG/DL (ref 0.5–1.2)
GLOBULIN: 2.4 G/DL (ref 1.9–3.6)
GLOMERULAR FILTRATION RATE: 104 MLS/MIN/1.73M2
GLUCOSE BLD-MCNC: 70 MG/DL (ref 70–99)
POTASSIUM SERPL-SCNC: 4.4 MEQ/L (ref 3.4–5.3)
SODIUM BLD-SCNC: 141 MEQ/L (ref 135–148)
STATUS: NORMAL
T3 FREE: 3.3 PG/ML (ref 2.3–4.2)
T4 FREE: 1.85 NG/DL (ref 0.8–1.8)
TOTAL PROTEIN: 7 G/DL (ref 6–8.3)
TSH SERPL DL<=0.05 MIU/L-ACNC: 0.16 MCIU/ML (ref 0.4–4.5)
VITAMIN D 25-HYDROXY: 47 NG/ML (ref 30–100)

## 2023-05-08 ENCOUNTER — OFFICE VISIT (OUTPATIENT)
Dept: ENDOCRINOLOGY | Age: 28
End: 2023-05-08
Payer: COMMERCIAL

## 2023-05-08 VITALS
HEART RATE: 89 BPM | WEIGHT: 116 LBS | RESPIRATION RATE: 14 BRPM | TEMPERATURE: 98 F | DIASTOLIC BLOOD PRESSURE: 72 MMHG | OXYGEN SATURATION: 100 % | BODY MASS INDEX: 19.33 KG/M2 | HEIGHT: 65 IN | SYSTOLIC BLOOD PRESSURE: 108 MMHG

## 2023-05-08 DIAGNOSIS — E83.52 HYPERCALCEMIA: ICD-10-CM

## 2023-05-08 DIAGNOSIS — E06.3 HASHIMOTO'S THYROIDITIS: ICD-10-CM

## 2023-05-08 DIAGNOSIS — E03.9 ACQUIRED HYPOTHYROIDISM: Primary | ICD-10-CM

## 2023-05-08 DIAGNOSIS — R79.0 LOW FERRITIN: ICD-10-CM

## 2023-05-08 PROCEDURE — 99214 OFFICE O/P EST MOD 30 MIN: CPT | Performed by: INTERNAL MEDICINE

## 2023-05-08 RX ORDER — LEVOTHYROXINE SODIUM 112 UG/1
TABLET ORAL
Qty: 30 TABLET | Refills: 3 | Status: SHIPPED | OUTPATIENT
Start: 2023-05-08

## 2023-05-08 NOTE — PROGRESS NOTES
are within  the range of normal for age. No cervical adenopathy is seen. Past Medical History:   Diagnosis Date    GERD (gastroesophageal reflux disease)     Hypothyroidism     Irritable bowel syndrome      Patient Active Problem List    Diagnosis Date Noted    Hypercalcemia 11/26/2019    Acquired hypothyroidism 12/15/2017    Hashimoto's thyroiditis 12/15/2017    Low ferritin 12/15/2017     Past Surgical History:   Procedure Laterality Date    COLONOSCOPY      UPPER GASTROINTESTINAL ENDOSCOPY       Family History   Problem Relation Age of Onset    No Known Problems Mother     High Cholesterol Father     Thyroid Disease Father      Social History     Socioeconomic History    Marital status: Single     Spouse name: None    Number of children: None    Years of education: None    Highest education level: None   Tobacco Use    Smoking status: Never    Smokeless tobacco: Never   Substance and Sexual Activity    Alcohol use: No    Drug use: No    Sexual activity: Never     Current Outpatient Medications   Medication Sig Dispense Refill    levothyroxine (SYNTHROID) 112 MCG tablet Take 1 full tablet 6 days weekly, 1/2 tablet 1 day a week 30 tablet 3    sertraline (ZOLOFT) 25 MG tablet TAKE 1 TABLET BY MOUTH EVERY DAY      spironolactone (ALDACTONE) 50 MG tablet TAKE 1 TABLET BY MOUTH EVERY DAY      SUMAtriptan (IMITREX) 25 MG tablet Take 1 tablet by mouth once as needed for Migraine      amphetamine-dextroamphetamine (ADDERALL) 10 MG tablet TAKE 1 TABLET BY MOUTH EVERY MORNING AND 1 TABLET AFTER LUNCH AS DIRECTED      Levonorgest-Eth Estrad 91-Day 0.1-0.02 & 0.01 MG TABS TAKE 1 TABLET BY MOUTH DAILY FOR 91 DAYS  1     No current facility-administered medications for this visit.      Allergies   Allergen Reactions    Sulfa Antibiotics Rash     Family Status   Relation Name Status    Mother  Alive    Father  Alive       Review of Systems:  Constitutional: has fatigue, no fever, no recent weight gain, no recent

## 2023-06-06 DIAGNOSIS — E03.9 ACQUIRED HYPOTHYROIDISM: ICD-10-CM

## 2023-06-06 RX ORDER — LEVOTHYROXINE SODIUM 112 UG/1
TABLET ORAL
Qty: 30 TABLET | Refills: 3 | OUTPATIENT
Start: 2023-06-06

## 2023-07-28 LAB — T3 FREE: 3 PG/ML (ref 2.3–4.2)

## 2023-07-29 LAB
T4 FREE: 1.38 NG/DL (ref 0.8–1.8)
TSH SERPL DL<=0.05 MIU/L-ACNC: 2.28 MCIU/ML (ref 0.4–4.5)

## 2023-07-30 ENCOUNTER — TELEPHONE (OUTPATIENT)
Dept: ENDOCRINOLOGY | Age: 28
End: 2023-07-30

## 2023-07-31 NOTE — TELEPHONE ENCOUNTER
Please inform patient that her lab work is normal does not explain her symptoms from thyroid standpoint.

## 2023-08-10 DIAGNOSIS — E03.9 ACQUIRED HYPOTHYROIDISM: ICD-10-CM

## 2023-08-11 RX ORDER — LEVOTHYROXINE SODIUM 112 UG/1
TABLET ORAL
Qty: 90 TABLET | Refills: 0 | Status: SHIPPED | OUTPATIENT
Start: 2023-08-11 | End: 2023-09-13 | Stop reason: SDUPTHER

## 2023-09-13 DIAGNOSIS — E03.9 ACQUIRED HYPOTHYROIDISM: ICD-10-CM

## 2023-09-13 RX ORDER — LEVOTHYROXINE SODIUM 112 UG/1
TABLET ORAL
Qty: 90 TABLET | Refills: 0 | Status: SHIPPED | OUTPATIENT
Start: 2023-09-13

## 2023-09-20 LAB
T3 FREE: 2.7 PG/ML (ref 2.3–4.2)
T4 FREE: 1.44 NG/DL (ref 0.8–1.8)
TSH SERPL DL<=0.05 MIU/L-ACNC: 8.51 MCIU/ML (ref 0.4–4.5)

## 2023-09-25 ENCOUNTER — TELEMEDICINE (OUTPATIENT)
Dept: ENDOCRINOLOGY | Age: 28
End: 2023-09-25
Payer: COMMERCIAL

## 2023-09-25 DIAGNOSIS — E06.3 HASHIMOTO'S THYROIDITIS: ICD-10-CM

## 2023-09-25 DIAGNOSIS — E83.52 HYPERCALCEMIA: ICD-10-CM

## 2023-09-25 DIAGNOSIS — E03.9 ACQUIRED HYPOTHYROIDISM: Primary | ICD-10-CM

## 2023-09-25 DIAGNOSIS — R79.0 LOW FERRITIN: ICD-10-CM

## 2023-09-25 PROCEDURE — 99214 OFFICE O/P EST MOD 30 MIN: CPT | Performed by: INTERNAL MEDICINE

## 2023-09-25 RX ORDER — LEVOTHYROXINE SODIUM 0.12 MG/1
125 TABLET ORAL DAILY
Qty: 30 TABLET | Refills: 3 | Status: SHIPPED | OUTPATIENT
Start: 2023-09-25

## 2023-09-25 NOTE — PROGRESS NOTES
SUBJECTIVE:  Kael Hinds is a 34 y.o. female who is being evaluated virtually for hypothyroidism. 1. Hypothyroidism     This started in 2015. Patient was diagnosed with hypothyroidism. The problem has been unchanged. Patient started medication in 2015. Currently patient is on: levothyroxine. Misses  0 doses a month. Current complaints: fatigue, dry skin, worsening vision, cold intolerance  [place] did not work, TSH was high. History of obstructive symptoms: difficulty swallowing No, changes in voice/hoarseness No.  History of radiation to patient's neck: No  Resent iodine exposure: No  Family history includes goiter. Family history of thyroid cancer: No    2. Hashimoto's thyroiditis  Has fatigue. 3. Low ferritin  Has fatigue. Worse in morning, afternoon. Moderate. Can not take supplement. 4. Hypercalcemia  No numbness, tingling  2 glasses of milk a day  1 Angie a day sometimes. Calcium 10. 6(10.5), repeated normal 9.7(10.1)  No MVI    Result Narrative   [address] [facility], [address]             PATIENT NAME: Kael Hinds                   ORDER:  [id number]  YOB: 1995                                            ACCT:    [de-identified]  DOCTOR:            Miky Nelson                               MR:  [id number]  LOCATION:         80  -----------------------------------------------------------------------------  THYROID ECHOGRAPHY US [facility] [place]             ORDERING DOCTOR:    ROSALBA FALCON  ALSO INCLUDES ORDER #(S):        -----------------------------------------------------------------------------  Thyroid ultrasound: 8/12/2015    Clinical History:  Goiter    Comparison:  None. Findings: The thyroid gland is diffusely heterogenous in echotexture  and hyperemic. No nodule or cyst is identified. The right thyroid  lobe measures approximately 4.2 x 1.4 x 1 cm. The left thyroid lobe  measures approximately 3.9 x 1 x 1.2 cm.

## 2023-12-07 LAB
ALBUMIN/GLOBULIN RATIO: 2 RATIO (ref 0.8–2.6)
ALBUMIN: 4.5 G/DL (ref 3.5–5.2)
ALP BLD-CCNC: 57 U/L (ref 23–144)
ALT SERPL-CCNC: 12 U/L (ref 0–60)
AST SERPL-CCNC: 17 U/L (ref 0–55)
BILIRUB SERPL-MCNC: 0.2 MG/DL (ref 0–1.2)
BUN BLDV-MCNC: 14 MG/DL (ref 3–29)
BUN/CREAT BLD: 18 (ref 7–25)
CALCIUM SERPL-MCNC: 10 MG/DL (ref 8.5–10.5)
CALCIUM SERPL-MCNC: 10.2 MG/DL (ref 8.5–10.5)
CHLORIDE BLD-SCNC: 105 MEQ/L (ref 96–110)
CO2: 26 MEQ/L (ref 19–32)
CREAT SERPL-MCNC: 0.8 MG/DL (ref 0.5–1.2)
FERRITIN: 44 NG/ML (ref 12–156)
GLOBULIN: 2.2 G/DL (ref 1.9–3.6)
GLOMERULAR FILTRATION RATE: 104 MLS/MIN/1.73M2
GLUCOSE BLD-MCNC: 73 MG/DL (ref 70–99)
HCT VFR BLD CALC: 38.4 % (ref 34–49)
HEMOGLOBIN: 12.9 G/DL (ref 11.2–15.7)
IRON SATURATION: 20 % (ref 12–57)
IRON: 80 MCG/DL (ref 35–175)
MCH RBC QN AUTO: 29.4 PG (ref 26–34)
MCHC RBC AUTO-ENTMCNC: 33.6 G/DL (ref 30.7–35.5)
MCV RBC AUTO: 87.5 FL (ref 80–100)
PARATHYROID HORMONE INTACT: 26 PG/ML (ref 15–65)
PDW BLD-RTO: 11.5 %
PLATELET # BLD: 373 K/UL (ref 140–400)
PMV BLD AUTO: 9.2 FL (ref 7.2–11.7)
POTASSIUM SERPL-SCNC: 4.6 MEQ/L (ref 3.4–5.3)
RBC # BLD: 4.39 M/UL (ref 3.95–5.26)
SODIUM BLD-SCNC: 141 MEQ/L (ref 135–148)
STATUS: NORMAL
T3 FREE: 3.3 PG/ML (ref 2.3–4.2)
T4 FREE: 1.51 NG/DL (ref 0.8–1.8)
TOTAL IRON BINDING CAPACITY: 400 MCG/DL (ref 200–450)
TOTAL PROTEIN: 6.7 G/DL (ref 6–8.3)
TSH SERPL DL<=0.05 MIU/L-ACNC: 0.54 MCIU/ML (ref 0.4–4.5)
VITAMIN D 25-HYDROXY: 36 NG/ML (ref 30–100)
WBC: 7.6 K/UL (ref 3.5–10.9)

## 2023-12-11 DIAGNOSIS — E03.9 ACQUIRED HYPOTHYROIDISM: ICD-10-CM

## 2023-12-11 RX ORDER — LEVOTHYROXINE SODIUM 112 UG/1
TABLET ORAL
Qty: 90 TABLET | Refills: 0 | OUTPATIENT
Start: 2023-12-11

## 2024-05-22 DIAGNOSIS — E03.9 ACQUIRED HYPOTHYROIDISM: ICD-10-CM

## 2024-05-22 RX ORDER — LEVOTHYROXINE SODIUM 112 UG/1
TABLET ORAL
Qty: 90 TABLET | Refills: 0 | OUTPATIENT
Start: 2024-05-22

## 2024-07-16 ENCOUNTER — TELEPHONE (OUTPATIENT)
Dept: ENDOCRINOLOGY | Age: 29
End: 2024-07-16

## 2024-07-16 NOTE — TELEPHONE ENCOUNTER
[facility] called stating they dont have the celiac screen with reflex the have diease antibody panel pt is at lab       [facility] [phone]

## 2024-07-17 LAB
A/G RATIO: 1.7 RATIO (ref 0.8–2.6)
ALBUMIN: 4.8 G/DL (ref 3.5–5.2)
ALP BLD-CCNC: 69 U/L (ref 23–144)
ALT SERPL-CCNC: 11 U/L (ref 0–60)
AST SERPL-CCNC: 17 U/L (ref 0–55)
BILIRUB SERPL-MCNC: 0.3 MG/DL (ref 0–1.2)
BUN / CREAT RATIO: 21 (ref 7–25)
BUN BLDV-MCNC: 15 MG/DL (ref 3–29)
CALCIUM SERPL-MCNC: 10.5 MG/DL (ref 8.5–10.5)
CALCIUM SERPL-MCNC: 10.5 MG/DL (ref 8.5–10.5)
CHLORIDE BLD-SCNC: 100 MEQ/L (ref 96–110)
CO2: 24 MEQ/L (ref 19–32)
CREAT SERPL-MCNC: 0.7 MG/DL (ref 0.5–1.2)
ENDOMYSIAL IGA ANTIBODY: NEGATIVE
ESTIMATED GLOMERULAR FILTRATION RATE CREATININE EQUATION: 121 MLS/MIN/1.73M2
FASTING STATUS: ABNORMAL
FOLATE: 7.4 NG/ML
GLIADIN PEPTIDE IGA: <20
GLIADIN PEPTIDE IGG: <20
GLOBULIN: 2.8 G/DL (ref 1.9–3.6)
GLUCOSE BLD-MCNC: 69 MG/DL (ref 70–99)
IMMUNOGLOBULIN A, SERUM: 183 MG/DL (ref 81–463)
MAGNESIUM: 2.1 MG/DL (ref 1.4–2.5)
PHOSPHORUS: 4.1 MG/DL (ref 2.5–4.5)
POTASSIUM SERPL-SCNC: 4.6 MEQ/L (ref 3.4–5.3)
PTH INTACT: 18 PG/ML (ref 15–65)
SODIUM BLD-SCNC: 138 MEQ/L (ref 135–148)
T3 FREE: 3.5 PG/ML (ref 2.3–4.2)
T4 FREE: 2.01 NG/DL (ref 0.8–1.8)
TOTAL PROTEIN: 7.6 G/DL (ref 6–8.3)
TRANSGLUTAMINASE IGA: <4
TSH ULTRASENSITIVE: 0.18 MCIU/ML (ref 0.4–4.5)
VITAMIN B-12: 544 PG/ML (ref 200–1100)
VITAMIN D 25-HYDROXY: 49 NG/ML (ref 30–100)

## 2024-07-18 LAB — CALCIUM IONIZED: 5.2 MG/DL (ref 4.7–5.5)

## 2024-07-19 ENCOUNTER — OFFICE VISIT (OUTPATIENT)
Dept: ENDOCRINOLOGY | Age: 29
End: 2024-07-19

## 2024-07-19 VITALS
WEIGHT: 111.8 LBS | DIASTOLIC BLOOD PRESSURE: 77 MMHG | RESPIRATION RATE: 14 BRPM | HEIGHT: 65 IN | BODY MASS INDEX: 18.63 KG/M2 | HEART RATE: 93 BPM | TEMPERATURE: 98 F | SYSTOLIC BLOOD PRESSURE: 130 MMHG | OXYGEN SATURATION: 99 %

## 2024-07-19 DIAGNOSIS — R79.0 LOW FERRITIN: ICD-10-CM

## 2024-07-19 DIAGNOSIS — E03.9 ACQUIRED HYPOTHYROIDISM: Primary | ICD-10-CM

## 2024-07-19 DIAGNOSIS — E06.3 HASHIMOTO'S THYROIDITIS: ICD-10-CM

## 2024-07-19 DIAGNOSIS — E83.52 HYPERCALCEMIA: ICD-10-CM

## 2024-07-19 RX ORDER — LEVOTHYROXINE SODIUM 0.12 MG/1
125 TABLET ORAL
Qty: 90 TABLET | Refills: 1 | Status: SHIPPED | OUTPATIENT
Start: 2024-07-19

## 2024-07-19 NOTE — PROGRESS NOTES
SUBJECTIVE:  Veronika Feng is a 29 y.o. female who is here for hypothyroidism.     1. Hypothyroidism     This started in 2015. Patient was diagnosed with hypothyroidism. The problem has been unchanged.   Patient started medication in 2015. Currently patient is on: levothyroxine. Misses  0 doses a month.     Current complaints: fatigue, dry skin, cold and heat intolerance, difficulty sleeping  [place] did not work, TSH was high.     History of obstructive symptoms: difficulty swallowing No, changes in voice/hoarseness No.  History of radiation to patient's neck: No  Resent iodine exposure: No  Family history includes goiter.  Family history of thyroid cancer: No     2. Hashimoto's thyroiditis  Has fatigue.     3. Low ferritin  Has fatigue.  Worse in morning, afternoon. Moderate.  Can not take supplement.     4. Hypercalcemia  No numbness, tingling  2 glasses of milk a day  1 Angie a day sometimes.  Calcium 10.6(10.5), repeated normal 9.7(10.1)  No MVI      [place]  [address]    MEDICAL IMAGING DEPARTMENT             PATIENT NAME: VERONIKA FENG                   ORDER:  [id number]  YOB: 1995                                            ACCT:    [id number]  DOCTOR:            ROSALBA FALCON                               MR:  [id number]  LOCATION:         82  -----------------------------------------------------------------------------  THYROID ECHOGRAPHY US [facility] [place]             ORDERING DOCTOR:    ROSALBA FALCNO  ALSO INCLUDES ORDER #(S):        -----------------------------------------------------------------------------  Thyroid ultrasound: 8/12/2015    Clinical History:  Goiter    Comparison:  None.    Findings:  The thyroid gland is diffusely heterogenous in echotexture  and hyperemic.  No nodule or cyst is identified.   The right thyroid  lobe measures approximately 4.2 x 1.4 x 1 cm. The left thyroid lobe  measures approximately 3.9 x 1 x 1.2 cm.

## 2024-10-29 LAB
A/G RATIO: 1.8 RATIO (ref 0.8–2.6)
ALBUMIN: 4.4 G/DL (ref 3.5–5.2)
ALP BLD-CCNC: 64 U/L (ref 23–144)
ALT SERPL-CCNC: 10 U/L (ref 0–60)
AST SERPL-CCNC: 16 U/L (ref 0–55)
BILIRUB SERPL-MCNC: 0.2 MG/DL (ref 0–1.2)
BUN / CREAT RATIO: 19 (ref 7–25)
BUN BLDV-MCNC: 13 MG/DL (ref 3–29)
CALCIUM SERPL-MCNC: 9.8 MG/DL (ref 8.5–10.5)
CHLORIDE BLD-SCNC: 107 MEQ/L (ref 96–110)
CO2: 22 MEQ/L (ref 19–32)
CREAT SERPL-MCNC: 0.7 MG/DL (ref 0.5–1.2)
ESTIMATED GLOMERULAR FILTRATION RATE CREATININE EQUATION: 121 MLS/MIN/1.73M2
FASTING STATUS: NORMAL
GLOBULIN: 2.4 G/DL (ref 1.9–3.6)
GLUCOSE BLD-MCNC: 70 MG/DL (ref 70–99)
POTASSIUM SERPL-SCNC: 4.6 MEQ/L (ref 3.4–5.3)
SODIUM BLD-SCNC: 142 MEQ/L (ref 135–148)
T4 FREE: 1.69 NG/DL (ref 0.8–1.8)
TOTAL PROTEIN: 6.8 G/DL (ref 6–8.3)
TSH ULTRASENSITIVE: 0.25 MCIU/ML (ref 0.4–4.5)
VITAMIN D 25-HYDROXY: 37 NG/ML (ref 30–100)

## 2024-10-31 ENCOUNTER — TELEPHONE (OUTPATIENT)
Dept: ENDOCRINOLOGY | Age: 29
End: 2024-10-31

## 2024-11-05 ENCOUNTER — OFFICE VISIT (OUTPATIENT)
Dept: ENDOCRINOLOGY | Age: 29
End: 2024-11-05
Payer: COMMERCIAL

## 2024-11-05 VITALS
TEMPERATURE: 98 F | HEART RATE: 102 BPM | RESPIRATION RATE: 14 BRPM | OXYGEN SATURATION: 100 % | DIASTOLIC BLOOD PRESSURE: 69 MMHG | HEIGHT: 65 IN | BODY MASS INDEX: 18.16 KG/M2 | WEIGHT: 109 LBS | SYSTOLIC BLOOD PRESSURE: 106 MMHG

## 2024-11-05 DIAGNOSIS — R79.0 LOW FERRITIN: ICD-10-CM

## 2024-11-05 DIAGNOSIS — E03.9 ACQUIRED HYPOTHYROIDISM: Primary | ICD-10-CM

## 2024-11-05 DIAGNOSIS — E83.52 HYPERCALCEMIA: ICD-10-CM

## 2024-11-05 DIAGNOSIS — E06.3 HASHIMOTO'S THYROIDITIS: ICD-10-CM

## 2024-11-05 PROCEDURE — 99214 OFFICE O/P EST MOD 30 MIN: CPT | Performed by: INTERNAL MEDICINE

## 2024-11-05 RX ORDER — LEVOTHYROXINE SODIUM 100 UG/1
100 TABLET ORAL DAILY
Qty: 90 TABLET | Refills: 1 | Status: SHIPPED | OUTPATIENT
Start: 2024-11-05

## 2024-11-05 RX ORDER — ETONOGESTREL/ETHINYL ESTRADIOL .12-.015MG
1 RING, VAGINAL VAGINAL SEE ADMIN INSTRUCTIONS
COMMUNITY
Start: 2024-09-03

## 2024-11-05 NOTE — PROGRESS NOTES
supplements.  Follow.  - Iron and TIBC; Future  - CBC; Future  - FERRITIN; Future     4. Hypercalcemia  Difficult to get 24 hour urine.  24-hour urine calcium not done.  Now calcium normal.  Sister has hypercalcemia, differential Dx includes [facility]  Obtain work up including 24 hour urine calcium  2 glasses of milk a day  Not on multivitamin.  Not on vitamin D supplement  Calcium 10.6(10.5), repeated normal 9.7(10.1)-10.1(10.5)- 10.0((10.5)-10.2 (10.5)-10.1-10 (10.5)-10.5-9.8  PTH 31.5-34-26-18  25OHvitamin D 31.2-09-56-54-51-06-49-37  Ionized calcium normal 5.1-5.2  Phosphorus 4.1  Magnesium 2.1  Urine protein electrophoresis normal  Sister has calcium upper limit normal as well.  Celiac panel negative    Reviewed and/or ordered clinical lab results Yes  Reviewed and/or ordered radiology tests Yes   Reviewed and/or ordered other diagnostic tests No  Discussed test results with performing physician No  Independently reviewed image, tracing, or specimen No  Made a decision to obtain old records Yes  Reviewed old records Yes   Obtained history from other than patient Yes    Laine Feng was counseled regarding symptoms of thyroid diagnosis, course and complications of disease if inadequately treated, side effects of medications, diagnosis, treatment options, and prognosis, risks, benefits, complications, and alternatives of treatment, labs, imaging and other studies and treatment targets and goals.  She understands instructions and counseling.      Return in about 4 months (around 3/5/2025) for thyroid problems.

## 2025-02-04 DIAGNOSIS — E06.3 HASHIMOTO'S THYROIDITIS: ICD-10-CM

## 2025-02-04 DIAGNOSIS — R79.0 LOW FERRITIN: ICD-10-CM

## 2025-02-04 DIAGNOSIS — E83.52 HYPERCALCEMIA: ICD-10-CM

## 2025-02-04 DIAGNOSIS — E03.9 ACQUIRED HYPOTHYROIDISM: ICD-10-CM

## 2025-02-05 RX ORDER — LEVOTHYROXINE SODIUM 100 UG/1
100 TABLET ORAL DAILY
Qty: 90 TABLET | Refills: 1 | Status: SHIPPED | OUTPATIENT
Start: 2025-02-05

## 2025-03-03 ENCOUNTER — TELEPHONE (OUTPATIENT)
Dept: ENDOCRINOLOGY | Age: 30
End: 2025-03-03

## 2025-03-03 LAB
A/G RATIO: 2 RATIO (ref 0.8–2.6)
ALBUMIN: 4.5 G/DL (ref 3.5–5.2)
ALP BLD-CCNC: 62 U/L (ref 23–144)
ALT SERPL-CCNC: 8 U/L (ref 0–60)
AST SERPL-CCNC: 16 U/L (ref 0–55)
BILIRUB SERPL-MCNC: 0.3 MG/DL (ref 0–1.2)
BUN / CREAT RATIO: 19 (ref 7–25)
BUN BLDV-MCNC: 13 MG/DL (ref 3–29)
CALCIUM SERPL-MCNC: 9.9 MG/DL (ref 8.5–10.5)
CHLORIDE BLD-SCNC: 102 MEQ/L (ref 96–110)
CO2: 22 MEQ/L (ref 19–32)
CREAT SERPL-MCNC: 0.7 MG/DL (ref 0.5–1.2)
ESTIMATED GLOMERULAR FILTRATION RATE CREATININE EQUATION: 121 MLS/MIN/1.73M2
FASTING STATUS: ABNORMAL
GLOBULIN: 2.3 G/DL (ref 1.9–3.6)
GLUCOSE BLD-MCNC: 107 MG/DL (ref 70–99)
POTASSIUM SERPL-SCNC: 3.8 MEQ/L (ref 3.4–5.3)
SODIUM BLD-SCNC: 140 MEQ/L (ref 135–148)
T4 FREE: 1.46 NG/DL (ref 0.8–1.8)
TOTAL PROTEIN: 6.8 G/DL (ref 6–8.3)
TSH ULTRASENSITIVE: 1.81 MCIU/ML (ref 0.4–4.5)
VITAMIN D 25-HYDROXY: 33 NG/ML (ref 30–100)

## 2025-03-03 NOTE — TELEPHONE ENCOUNTER
2nd attempt to contact pt's primary phone, line continues to ring busy. Called pt's home phone in chart, LVM that OK to change to VV

## 2025-03-03 NOTE — TELEPHONE ENCOUNTER
Pt called in to see if she can have her appt on 3-6-25 vv due to work. She said to call her and let her know if that is ok and to leave a vm letting her know if she doesn't answer

## 2025-03-04 ENCOUNTER — TELEPHONE (OUTPATIENT)
Dept: ENDOCRINOLOGY | Age: 30
End: 2025-03-04

## 2025-03-06 ENCOUNTER — TELEMEDICINE (OUTPATIENT)
Dept: ENDOCRINOLOGY | Age: 30
End: 2025-03-06
Payer: COMMERCIAL

## 2025-03-06 DIAGNOSIS — E83.52 HYPERCALCEMIA: ICD-10-CM

## 2025-03-06 DIAGNOSIS — E06.3 HASHIMOTO'S THYROIDITIS: ICD-10-CM

## 2025-03-06 DIAGNOSIS — R79.0 LOW FERRITIN: ICD-10-CM

## 2025-03-06 DIAGNOSIS — E03.9 ACQUIRED HYPOTHYROIDISM: Primary | ICD-10-CM

## 2025-03-06 PROCEDURE — 99214 OFFICE O/P EST MOD 30 MIN: CPT | Performed by: INTERNAL MEDICINE

## 2025-03-06 RX ORDER — LEVOTHYROXINE SODIUM 100 UG/1
100 TABLET ORAL DAILY
Qty: 90 TABLET | Refills: 1 | Status: SHIPPED | OUTPATIENT
Start: 2025-03-06

## 2025-03-06 NOTE — PROGRESS NOTES
alternatives of treatment, labs, imaging and other studies and treatment targets and goals.  She understands instructions and counseling.      Return in about 4 months (around 7/6/2025) for thyroid problems.